# Patient Record
Sex: MALE | Race: BLACK OR AFRICAN AMERICAN | Employment: UNEMPLOYED | URBAN - METROPOLITAN AREA
[De-identification: names, ages, dates, MRNs, and addresses within clinical notes are randomized per-mention and may not be internally consistent; named-entity substitution may affect disease eponyms.]

---

## 2017-04-10 ENCOUNTER — OFFICE VISIT (OUTPATIENT)
Dept: INTERNAL MEDICINE CLINIC | Age: 57
End: 2017-04-10

## 2017-04-10 VITALS
DIASTOLIC BLOOD PRESSURE: 95 MMHG | HEART RATE: 71 BPM | TEMPERATURE: 97.8 F | BODY MASS INDEX: 28.58 KG/M2 | HEIGHT: 70 IN | OXYGEN SATURATION: 99 % | SYSTOLIC BLOOD PRESSURE: 151 MMHG | RESPIRATION RATE: 18 BRPM | WEIGHT: 199.6 LBS

## 2017-04-10 DIAGNOSIS — Z00.00 PHYSICAL EXAM: ICD-10-CM

## 2017-04-10 DIAGNOSIS — M10.9 ACUTE GOUT OF RIGHT KNEE, UNSPECIFIED CAUSE: Primary | ICD-10-CM

## 2017-04-10 RX ORDER — TRIAMCINOLONE ACETONIDE 40 MG/ML
40 INJECTION, SUSPENSION INTRA-ARTICULAR; INTRAMUSCULAR ONCE
Qty: 1 ML | Refills: 0
Start: 2017-04-10 | End: 2017-04-10

## 2017-04-10 RX ORDER — INDOMETHACIN 50 MG/1
50 CAPSULE ORAL
Qty: 90 CAP | Refills: 6 | Status: SHIPPED | OUTPATIENT
Start: 2017-04-10 | End: 2019-04-03 | Stop reason: SDUPTHER

## 2017-04-10 RX ORDER — LIDOCAINE HYDROCHLORIDE 10 MG/ML
5 INJECTION, SOLUTION EPIDURAL; INFILTRATION; INTRACAUDAL; PERINEURAL ONCE
Qty: 5 ML | Refills: 0
Start: 2017-04-10 | End: 2017-04-10

## 2017-04-10 NOTE — PROGRESS NOTES
1. Have you been to the ER, urgent care clinic since your last visit? Hospitalized since your last visit? No    2. Have you seen or consulted any other health care providers outside of the [address] [place] [address] since your last visit? Include any pap smears or colon screening.  No

## 2017-04-10 NOTE — MR AVS SNAPSHOT
Visit Information Date & Time Provider Department Dept. Phone Encounter #  
 4/10/2017 10:30 AM Joshua Duncan MD Formerly [facility] Sports Medicine and Primary Care [phone] [id number] Your Appointments 5/22/2017  9:30 AM  
Any with Joshua Duncan MD  
[address] [place] [address] and Primary Care [place] campus) Appt Note: 6 week f/u  
 Jeffrey Brar 90 1 [facility]  
  
   
 [address] [id number] Upcoming Health Maintenance Date Due Hepatitis C Screening 1960 FOBT Q 1 YEAR AGE 50-75 5/27/2016 DTaP/Tdap/Td series (2 - Td) 4/10/2027 Allergies as of 4/10/2017  Review Complete On: 4/10/2017 By: Perla Bolaños No Known Allergies Current Immunizations  Never Reviewed No immunizations on file. Not reviewed this visit You Were Diagnosed With   
  
 Codes Comments Acute gout of right knee, unspecified cause    -  Primary ICD-10-CM: M10.9 ICD-9-CM: 274.01 Physical exam     ICD-10-CM: Z00.00 ICD-9-CM: V70.9 Vitals BP Pulse Temp Resp Height(growth percentile) Weight(growth percentile) (!) 151/95 (BP 1 Location: Left arm, BP Patient Position: Sitting) 71 97.8 °F (36.6 °C) (Oral) 18 5' 10\" (1.778 m) 199 lb 9.6 oz (90.5 kg) SpO2 BMI Smoking Status 99% 28.64 kg/m2 Current Some Day Smoker Vitals History BMI and BSA Data Body Mass Index Body Surface Area  
 28.64 kg/m 2 2.11 m 2 Preferred Pharmacy Pharmacy Name Phone [facility] [address], [address] [facility] [phone] Your Updated Medication List  
  
   
This list is accurate as of: 4/10/17 11:38 AM.  Always use your most recent med list.  
  
  
  
  
 aspirin delayed-release 81 mg tablet Take  by mouth daily. indomethacin 50 mg capsule Commonly known as:  INDOCIN Take 1 Cap by mouth three (3) times daily as needed. lidocaine (PF) 10 mg/mL (1 %) injection Commonly known as:  XYLOCAINE  
5 mL by Intra artICUlar route once for 1 dose. triamcinolone acetonide 40 mg/mL injection Commonly known as:  KENALOG  
1 mL by IntraMUSCular route once for 1 dose. Prescriptions Sent to Pharmacy Refills  
 indomethacin (INDOCIN) 50 mg capsule 6 Sig: Take 1 Cap by mouth three (3) times daily as needed. Class: Normal  
 Pharmacy: [facility] [address] [place], [place] [address], [address] [place] [address] Ph #: [phone] Route: Oral  
  
We Performed the Following APOLIPOPROTEIN B [id number] CPT(R)] CBC WITH AUTOMATED DIFF [91573 CPT(R)] CRYSTALS, SYNOVIAL FLUID [id number] CPT(R)] LIPID PANEL [83208 CPT(R)] METABOLIC PANEL, COMPREHENSIVE [28468 CPT(R)] [place] COLLECTION VENOUS BLOOD,VENIPUNCTURE [id number] CPT(R)] PROSTATE SPECIFIC AG (PSA) U5567537 CPT(R)] TRIAMCINOLONE ACETONIDE INJ [ HCPCS] URIC ACID D8841048 CPT(R)] URINALYSIS W/ RFLX MICROSCOPIC [65040 CPT(R)] Introducing [facility] & HEALTH SERVICES! Dear Hosie Gosselin: Thank you for requesting a TwoF account. Our records indicate that you already have an active TwoF account. You can access your account anytime at [email or web address] Did you know that you can access your hospital and ER discharge instructions at any time in TwoF? You can also review all of your test results from your hospital stay or ER visit. Additional Information If you have questions, please visit the Frequently Asked Questions section of the TwoF website at [email or web address] Remember, TwoF is NOT to be used for urgent needs. For medical emergencies, dial 911. Now available from your iPhone and Android! Please provide this summary of care documentation to your next provider. If you have any questions after today's visit, please call [phone].

## 2017-04-10 NOTE — LETTER
NOTIFICATION RETURN TO WORK / SCHOOL 
 
4/10/2017 11:39 AM 
 
Mr. Arnoldo Lucio 
[address] [place] [address] To Whom It May Concern: 
 
Arnoldo Lucio is currently under the care of Jeffrey Edmonds Please excuse Mr. Goetz from work starting: Monday, April 10th thru Wednesday, April 12th. He will return to work on: Thursday, April 13, 2017. If there are questions or concerns please have the patient contact our office. Sincerely, Willow Purcell MD

## 2017-04-16 NOTE — PROGRESS NOTES
Chief Complaint   Patient presents with    Knee Pain     patient complain of having right knee pain    . SUBECTIVE:    Lia Mai is a 59 y.o. male comes in for a physical examination. The actual reason he came was because of acute swelling and pain of his right knee. There has been no history of trauma. The pain started and intensified within a twenty-four hour period. He does have a history of gout based on a previous synovial analysis done in 2014. He has been free of joint pain since then, however. Current Outpatient Prescriptions   Medication Sig Dispense Refill    indomethacin (INDOCIN) 50 mg capsule Take 1 Cap by mouth three (3) times daily as needed. 90 Cap 6    aspirin delayed-release 81 mg tablet Take  by mouth daily.        Past Medical History:   Diagnosis Date    Arthritis     Hypercholesterolemia      Past Surgical History:   Procedure Laterality Date    HX COLONOSCOPY       No Known Allergies    REVIEW OF SYSTEMS:  Review of Systems - Negative except   ENT ROS: negative for - headaches, hearing change, nasal congestion, oral lesions, tinnitus, visual changes or   Respiratory ROS: no cough, shortness of breath, or wheezing  Cardiovascular ROS: no chest pain or dyspnea on exertion  Gastrointestinal ROS: no abdominal pain, change in bowel habits, or black or blood  Genito-Urinary ROS: no dysuria, trouble voiding, or hematuria  Musculoskeletal ROS: negative  Neurological ROS: no TIA or stroke symptoms      Social History     Social History    Marital status:      Spouse name: N/A    Number of children: N/A    Years of education: N/A     Social History Main Topics    Smoking status: Current Some Day Smoker    Smokeless tobacco: None    Alcohol use 0.5 oz/week     1 Cans of beer per week    Drug use: None    Sexual activity: Not Asked     Other Topics Concern    None     Social History Narrative   r  Family History   Problem Relation Age of Onset    Cancer Mother        OBJECTIVE:  Visit Vitals    BP (!) 151/95 (BP 1 Location: Left arm, BP Patient Position: Sitting)    Pulse 71    Temp 97.8 °F (36.6 °C) (Oral)    Resp 18    Ht 5' 10\" (1.778 m)    Wt 199 lb 9.6 oz (90.5 kg)    SpO2 99%    BMI 28.64 kg/m2     ENT: perrla,  eom intact  NECK: supple. Thyroid normal, no JVD  CHEST: clear to ascultation and percussion   HEART: regular rate and rhythm  ABD: soft, bowel sounds active,   EXTREMITIES: no edema, pulse 1+, moderate swelling and warmth right knee, pain elicited to flexion and hyperextension right knee, modest effusion right knee  INTEGUMENT: clear      ASSESSMENT:  1. Acute gout of right knee, unspecified cause    2. Physical exam        PLAN:    1. The patient has an acute monoarticular arthritis. This is probably gout. The knee is injected with Kenalog 40 mg and Xylocaine 1% 5 cc under sterile technique laterally. He tolerates the injection well. Empiric treatment with indomethacin will occur. 2. He will return to the office in six months for followup. 3. Lab work will be drawn today. .  Orders Placed This Encounter    APOLIPOPROTEIN B    CBC WITH AUTOMATED DIFF    LIPID PANEL    URINALYSIS W/ RFLX MICROSCOPIC    PROSTATE SPECIFIC AG    METABOLIC PANEL, COMPREHENSIVE    URIC ACID    indomethacin (INDOCIN) 50 mg capsule    triamcinolone acetonide (KENALOG) 40 mg/mL injection    lidocaine, PF, (XYLOCAINE) 10 mg/mL (1 %) injection       Follow-up Disposition:  Return in about 6 months (around 10/10/2017).       Trish Martin MD

## 2017-05-22 ENCOUNTER — OFFICE VISIT (OUTPATIENT)
Dept: INTERNAL MEDICINE CLINIC | Age: 57
End: 2017-05-22

## 2017-05-22 VITALS
OXYGEN SATURATION: 98 % | HEIGHT: 70 IN | BODY MASS INDEX: 28.24 KG/M2 | RESPIRATION RATE: 20 BRPM | WEIGHT: 197.3 LBS | DIASTOLIC BLOOD PRESSURE: 80 MMHG | SYSTOLIC BLOOD PRESSURE: 143 MMHG | TEMPERATURE: 98.7 F | HEART RATE: 67 BPM

## 2017-05-22 DIAGNOSIS — F10.10 ALCOHOL ABUSE: ICD-10-CM

## 2017-05-22 DIAGNOSIS — M10.9 GOUT, UNSPECIFIED CAUSE, UNSPECIFIED CHRONICITY, UNSPECIFIED SITE: Primary | ICD-10-CM

## 2017-05-22 DIAGNOSIS — Z00.00 PHYSICAL EXAM: ICD-10-CM

## 2017-05-22 RX ORDER — ALLOPURINOL 300 MG/1
300 TABLET ORAL DAILY
Qty: 30 TAB | Refills: 11 | Status: SHIPPED | OUTPATIENT
Start: 2017-05-22 | End: 2019-12-31 | Stop reason: SDUPTHER

## 2017-05-22 NOTE — MR AVS SNAPSHOT
Visit Information Date & Time Provider Department Dept. Phone Encounter #  
 5/22/2017  9:30 AM Laureano Duong MD [facility] Sports Medicine and [place] [address] [id number] Follow-up Instructions Return in about 6 months (around 11/22/2017). Upcoming Health Maintenance Date Due Hepatitis C Screening 1960 FOBT Q 1 YEAR AGE 50-75 5/27/2016 INFLUENZA AGE 9 TO ADULT 8/1/2017 DTaP/Tdap/Td series (2 - Td) 4/10/2027 Allergies as of 5/22/2017  Review Complete On: 5/22/2017 By: Katerine Cowart No Known Allergies Current Immunizations  Never Reviewed No immunizations on file. Not reviewed this visit You Were Diagnosed With   
  
 Codes Comments Gout, unspecified cause, unspecified chronicity, unspecified site    -  Primary ICD-10-CM: M10.9 ICD-9-CM: 274.9 Physical exam     ICD-10-CM: Z00.00 ICD-9-CM: V70.9 Alcohol abuse     ICD-10-CM: F10.10 ICD-9-CM: 305.00 Vitals BP Pulse Temp Resp Height(growth percentile) Weight(growth percentile) 143/80 (BP 1 Location: Right arm, BP Patient Position: Sitting) 67 98.7 °F (37.1 °C) (Oral) 20 5' 10\" (1.778 m) 197 lb 4.8 oz (89.5 kg) SpO2 BMI Smoking Status 98% 28.31 kg/m2 Current Some Day Smoker Vitals History BMI and BSA Data Body Mass Index Body Surface Area  
 28.31 kg/m 2 2.1 m 2 Preferred Pharmacy Pharmacy Name Phone Briseyda  [address], [address] [place] [address] [phone] Your Updated Medication List  
  
   
This list is accurate as of: 5/22/17 11:09 AM.  Always use your most recent med list.  
  
  
  
  
 allopurinol 300 mg tablet Commonly known as:  Katerine Ricardo Take 1 Tab by mouth daily. aspirin delayed-release 81 mg tablet Take  by mouth daily. indomethacin 50 mg capsule Commonly known as:  INDOCIN  
 Take 1 Cap by mouth three (3) times daily as needed. Prescriptions Sent to Pharmacy Refills  
 allopurinol (ZYLOPRIM) 300 mg tablet 11 Sig: Take 1 Tab by mouth daily. Class: Normal  
 Pharmacy: [facility], [address] [place] [address] #: [phone] Route: Oral  
  
We Performed the Following APOLIPOPROTEIN B [id number] CPT(R)] CBC WITH AUTOMATED DIFF [89878 CPT(R)] CRP, HIGH SENSITIVITY [14863 CPT(R)] LIPID PANEL [63704 CPT(R)] METABOLIC PANEL, COMPREHENSIVE [72537 CPT(R)] OCCULT BLOOD, IMMUNOASSAY (FIT) A1156208 CPT(R)] CT COLLECTION VENOUS BLOOD,VENIPUNCTURE M205595 CPT(R)] PROSTATE SPECIFIC AG (PSA) R722933 CPT(R)] URIC ACID M2014725 CPT(R)] URINALYSIS W/ RFLX MICROSCOPIC [78333 CPT(R)] Follow-up Instructions Return in about 6 months (around 11/22/2017). Introducing [facility] & HEALTH SERVICES! Dear Pepper Boeck: Thank you for requesting a Neokinetics account. Our records indicate that you already have an active Neokinetics account. You can access your account anytime at [email or web address] Did you know that you can access your hospital and ER discharge instructions at any time in Neokinetics? You can also review all of your test results from your hospital stay or ER visit. Additional Information If you have questions, please visit the Frequently Asked Questions section of the Neokinetics website at [email or web address] Remember, Neokinetics is NOT to be used for urgent needs. For medical emergencies, dial 911. Now available from your iPhone and Android! Please provide this summary of care documentation to your next provider. If you have any questions after today's visit, please call [phone].

## 2017-05-22 NOTE — PROGRESS NOTES
Chief Complaint   Patient presents with    Gout     6 week follow up for gout in right knee   1. Have you been to the ER, urgent care clinic since your last visit? Hospitalized since your last visit? No    2. Have you seen or consulted any other health care providers outside of the [address] [place] [address] since your last visit? Include any pap smears or colon screening.  No

## 2017-05-22 NOTE — PROGRESS NOTES
Chief Complaint   Patient presents with    Gout     6 week follow up for gout in right knee   . SUBECTIVE:    Redd Maynard is a 59 y.o. male comes in for return visit stating that he feels much better. His gout has resolved. I am assuming this is the etiology although I have not formally documented it from uric acid crystals from synovial analysis. His clinical history is quite suggestive of this. He states that he otherwise has bene doing well. He drinks alcohol, almost a six pack a day. Current Outpatient Prescriptions   Medication Sig Dispense Refill    allopurinol (ZYLOPRIM) 300 mg tablet Take 1 Tab by mouth daily. 30 Tab 11    indomethacin (INDOCIN) 50 mg capsule Take 1 Cap by mouth three (3) times daily as needed. 90 Cap 6    aspirin delayed-release 81 mg tablet Take  by mouth daily.        Past Medical History:   Diagnosis Date    Arthritis     Hypercholesterolemia      Past Surgical History:   Procedure Laterality Date    HX COLONOSCOPY       No Known Allergies    REVIEW OF SYSTEMS:  Review of Systems - Negative except   ENT ROS: negative for - headaches, hearing change, nasal congestion, oral lesions, tinnitus, visual changes or   Respiratory ROS: no cough, shortness of breath, or wheezing  Cardiovascular ROS: no chest pain or dyspnea on exertion  Gastrointestinal ROS: no abdominal pain, change in bowel habits, or black or blood  Genito-Urinary ROS: no dysuria, trouble voiding, or hematuria  Musculoskeletal ROS: negative  Neurological ROS: no TIA or stroke symptoms      Social History     Social History    Marital status:      Spouse name: N/A    Number of children: N/A    Years of education: N/A     Social History Main Topics    Smoking status: Current Some Day Smoker    Smokeless tobacco: Never Used    Alcohol use 0.5 oz/week     1 Cans of beer per week    Drug use: No    Sexual activity: Yes     Partners: Female     Other Topics Concern    None Social History Narrative   r  Family History   Problem Relation Age of Onset    Cancer Mother        OBJECTIVE:  Visit Vitals    /80 (BP 1 Location: Right arm, BP Patient Position: Sitting)    Pulse 67    Temp 98.7 °F (37.1 °C) (Oral)    Resp 20    Ht 5' 10\" (1.778 m)    Wt 197 lb 4.8 oz (89.5 kg)    SpO2 98%    BMI 28.31 kg/m2     ENT: perrla,  eom intact  NECK: supple. Thyroid normal, no JVD  CHEST: clear to ascultation and percussion   HEART: regular rate and rhythm  ABD: soft, bowel sounds active,   EXTREMITIES: no edema, pulse 1+, normal range of motion of all joints  INTEGUMENT: clear      ASSESSMENT:  1. Gout, unspecified cause, unspecified chronicity, unspecified site    2. Alcohol abuse    3. Physical exam        PLAN:    1. He has been having recurrent episodes of gout which is not surprising with his alcohol intake. I will therefore start him on Allopurinol 300 mg every day. For the first three weeks of taking this he will take Indomethacin 50 mg b.i.d. Thereafter he will discontinue Indomethacin and hopefully he will be over the potential flares induced by initiation of Allopurinol. 2. I also strongly suggest he reduce his alcohol consumption. He is drinking too much. I suggest no more than two or three beers max in a 24 hour period. 3. Lab work will be drawn today. Follow-up Disposition:  Return in about 6 months (around 11/22/2017).       Jessa Malloy MD

## 2017-05-23 LAB
ALBUMIN SERPL-MCNC: 4.4 G/DL (ref 3.5–5.5)
ALBUMIN/GLOB SERPL: 1.6 {RATIO} (ref 1.2–2.2)
ALP SERPL-CCNC: 50 IU/L (ref 39–117)
ALT SERPL-CCNC: 38 IU/L (ref 0–44)
APO B SERPL-MCNC: 106 MG/DL (ref 52–135)
APPEARANCE UR: CLEAR
AST SERPL-CCNC: 29 IU/L (ref 0–40)
BASOPHILS # BLD AUTO: 0 X10E3/UL (ref 0–0.2)
BASOPHILS NFR BLD AUTO: 0 %
BILIRUB SERPL-MCNC: 0.3 MG/DL (ref 0–1.2)
BILIRUB UR QL STRIP: NEGATIVE
BUN SERPL-MCNC: 15 MG/DL (ref 6–24)
BUN/CREAT SERPL: 17 (ref 9–20)
CALCIUM SERPL-MCNC: 9.6 MG/DL (ref 8.7–10.2)
CHLORIDE SERPL-SCNC: 101 MMOL/L (ref 96–106)
CHOLEST SERPL-MCNC: 191 MG/DL (ref 100–199)
CO2 SERPL-SCNC: 27 MMOL/L (ref 18–29)
COLOR UR: YELLOW
CREAT SERPL-MCNC: 0.89 MG/DL (ref 0.76–1.27)
CRP SERPL HS-MCNC: 1.1 MG/L (ref 0–3)
EOSINOPHIL # BLD AUTO: 0.1 X10E3/UL (ref 0–0.4)
EOSINOPHIL NFR BLD AUTO: 1 %
ERYTHROCYTE [DISTWIDTH] IN BLOOD BY AUTOMATED COUNT: 14.1 % (ref 12.3–15.4)
GLOBULIN SER CALC-MCNC: 2.7 G/DL (ref 1.5–4.5)
GLUCOSE SERPL-MCNC: 85 MG/DL (ref 65–99)
GLUCOSE UR QL: NEGATIVE
HCT VFR BLD AUTO: 43.6 % (ref 37.5–51)
HDLC SERPL-MCNC: 68 MG/DL
HGB BLD-MCNC: 14.7 G/DL (ref 12.6–17.7)
HGB UR QL STRIP: NEGATIVE
IMM GRANULOCYTES # BLD: 0.1 X10E3/UL (ref 0–0.1)
IMM GRANULOCYTES NFR BLD: 1 %
KETONES UR QL STRIP: NEGATIVE
LDLC SERPL CALC-MCNC: 110 MG/DL (ref 0–99)
LEUKOCYTE ESTERASE UR QL STRIP: NEGATIVE
LYMPHOCYTES # BLD AUTO: 2.1 X10E3/UL (ref 0.7–3.1)
LYMPHOCYTES NFR BLD AUTO: 27 %
MCH RBC QN AUTO: 31.6 PG (ref 26.6–33)
MCHC RBC AUTO-ENTMCNC: 33.7 G/DL (ref 31.5–35.7)
MCV RBC AUTO: 94 FL (ref 79–97)
MICRO URNS: NORMAL
MONOCYTES # BLD AUTO: 0.7 X10E3/UL (ref 0.1–0.9)
MONOCYTES NFR BLD AUTO: 9 %
NEUTROPHILS # BLD AUTO: 4.9 X10E3/UL (ref 1.4–7)
NEUTROPHILS NFR BLD AUTO: 62 %
NITRITE UR QL STRIP: NEGATIVE
PH UR STRIP: 6 [PH] (ref 5–7.5)
PLATELET # BLD AUTO: 270 X10E3/UL (ref 150–379)
POTASSIUM SERPL-SCNC: 5.2 MMOL/L (ref 3.5–5.2)
PROT SERPL-MCNC: 7.1 G/DL (ref 6–8.5)
PROT UR QL STRIP: NEGATIVE
PSA SERPL-MCNC: 0.4 NG/ML (ref 0–4)
RBC # BLD AUTO: 4.65 X10E6/UL (ref 4.14–5.8)
SODIUM SERPL-SCNC: 144 MMOL/L (ref 134–144)
SP GR UR: 1.02 (ref 1–1.03)
TRIGL SERPL-MCNC: 64 MG/DL (ref 0–149)
URATE SERPL-MCNC: 9.4 MG/DL (ref 3.7–8.6)
UROBILINOGEN UR STRIP-MCNC: 0.2 MG/DL (ref 0.2–1)
VLDLC SERPL CALC-MCNC: 13 MG/DL (ref 5–40)
WBC # BLD AUTO: 7.7 X10E3/UL (ref 3.4–10.8)

## 2017-12-12 ENCOUNTER — OFFICE VISIT (OUTPATIENT)
Dept: INTERNAL MEDICINE CLINIC | Age: 57
End: 2017-12-12

## 2017-12-12 VITALS
HEIGHT: 70 IN | RESPIRATION RATE: 16 BRPM | BODY MASS INDEX: 27.8 KG/M2 | SYSTOLIC BLOOD PRESSURE: 143 MMHG | WEIGHT: 194.2 LBS | HEART RATE: 75 BPM | OXYGEN SATURATION: 97 % | DIASTOLIC BLOOD PRESSURE: 81 MMHG | TEMPERATURE: 98.1 F

## 2017-12-12 DIAGNOSIS — J30.89 CHRONIC NONSEASONAL ALLERGIC RHINITIS DUE TO OTHER ALLERGEN: Primary | ICD-10-CM

## 2017-12-12 DIAGNOSIS — M1A.4690 OTHER SECONDARY CHRONIC GOUT OF KNEE WITHOUT TOPHUS, UNSPECIFIED LATERALITY: ICD-10-CM

## 2017-12-12 PROBLEM — J30.9 ALLERGIC RHINITIS DUE TO ALLERGEN: Status: ACTIVE | Noted: 2017-12-12

## 2017-12-12 PROBLEM — M10.9 GOUT: Status: ACTIVE | Noted: 2017-12-12

## 2017-12-12 RX ORDER — POLYETHYLENE GLYCOL 3350 17 G/17G
17 POWDER, FOR SOLUTION ORAL DAILY
COMMUNITY
End: 2022-02-06

## 2017-12-12 RX ORDER — FLUTICASONE PROPIONATE 50 MCG
SPRAY, SUSPENSION (ML) NASAL
Qty: 1 BOTTLE | Refills: 11 | Status: SHIPPED | OUTPATIENT
Start: 2017-12-12 | End: 2019-12-31 | Stop reason: SDUPTHER

## 2017-12-12 NOTE — PROGRESS NOTES
Chief Complaint   Patient presents with    Physical     patient states that he has been trying to experiencing some sinus congestion lately. .      SUBECTIVE:    Jasmin Rodrigues is a 59 y.o. male comes in for a return visit stating that he has not done well. For the last month, he has had upper respiratory-type symptoms episodically. He has nasal congestion with frequent sneezing and clearing of his throat. Additionally, he has a cough without any audible wheezing. He does not smoke cigarettes. He has not had any further gouty episodes either. He has been taking his allopurinal.      He has also reduced his consumption of beer. Current Outpatient Prescriptions   Medication Sig Dispense Refill    fluticasone (FLONASE) 50 mcg/actuation nasal spray 2 sprays in each nostril daily 1 Bottle 11    polyethylene glycol (MIRALAX) 17 gram packet Take 17 g by mouth daily.  allopurinol (ZYLOPRIM) 300 mg tablet Take 1 Tab by mouth daily. 30 Tab 11    indomethacin (INDOCIN) 50 mg capsule Take 1 Cap by mouth three (3) times daily as needed. 90 Cap 6    aspirin delayed-release 81 mg tablet Take  by mouth daily.        Past Medical History:   Diagnosis Date    Arthritis     Hypercholesterolemia      Past Surgical History:   Procedure Laterality Date    HX COLONOSCOPY       No Known Allergies    REVIEW OF SYSTEMS:  Review of Systems - Negative except   ENT ROS: negative for - headaches, hearing change, nasal congestion, oral lesions, tinnitus, visual changes or   Respiratory ROS: no cough, shortness of breath, or wheezing  Cardiovascular ROS: no chest pain or dyspnea on exertion  Gastrointestinal ROS: no abdominal pain, change in bowel habits, or black or blood  Genito-Urinary ROS: no dysuria, trouble voiding, or hematuria  Musculoskeletal ROS: negative  Neurological ROS: no TIA or stroke symptoms      Social History     Social History    Marital status:      Spouse name: N/A    Number of children: N/A    Years of education: N/A     Social History Main Topics    Smoking status: Current Some Day Smoker    Smokeless tobacco: Never Used    Alcohol use 0.5 oz/week     1 Cans of beer per week    Drug use: No    Sexual activity: Yes     Partners: Female     Other Topics Concern    None     Social History Narrative   r  Family History   Problem Relation Age of Onset    Cancer Mother        OBJECTIVE:  Visit Vitals    /81 (BP 1 Location: Left arm, BP Patient Position: Sitting)    Pulse 75    Temp 98.1 °F (36.7 °C) (Oral)    Resp 16    Ht 5' 10\" (1.778 m)    Wt 194 lb 3.2 oz (88.1 kg)    SpO2 97%    BMI 27.86 kg/m2     ENT: perrla,  eom intact  NECK: supple. Thyroid normal, no JVD  CHEST: clear to ascultation and percussion   HEART: regular rate and rhythm  ABD: soft, bowel sounds active,   EXTREMITIES: no edema, pulse 1+  INTEGUMENT: clear      ASSESSMENT:  1. Chronic nonseasonal allergic rhinitis due to other allergen    2. 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 The patient has a rhinitis. I will give him Flonase two sprays each nostril q.d. along with a long-acting antihistamine. 2. His gout appears to be quite stable, but I remind him to take his allopurinal daily so it will not return. 3. I also strongly suggest that he reduce his intake of beer. .  Orders Placed This Encounter    fluticasone (FLONASE) 50 mcg/actuation nasal spray    polyethylene glycol (MIRALAX) 17 gram packet       Follow-up Disposition:  Return in about 3 months (around 3/12/2018).       Yohannes Marshall MD

## 2017-12-12 NOTE — PROGRESS NOTES
Chief Complaint   Patient presents with    Physical     patient states that he has been trying to experiencing some sinus congestion lately. 1. Have you been to the ER, urgent care clinic since your last visit? Hospitalized since your last visit? No    2. Have you seen or consulted any other health care providers outside of the [address] [place] [address] since your last visit? Include any pap smears or colon screening.  No

## 2017-12-12 NOTE — MR AVS SNAPSHOT
Visit Information Date & Time Provider Department Dept. Phone Encounter #  
 12/12/2017 10:45 AM Savana Quintana [facility] Sports Medicine and [address] [id number] Upcoming Health Maintenance Date Due Hepatitis C Screening 1960 FOBT Q 1 YEAR AGE 50-75 5/27/2016 DTaP/Tdap/Td series (2 - Td) 4/10/2027 Allergies as of 12/12/2017  Review Complete On: 12/12/2017 By: Lisa Ferrara No Known Allergies Current Immunizations  Never Reviewed No immunizations on file. Not reviewed this visit You Were Diagnosed With   
  
 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  
 27.86 kg/m 2 2.09 m 2 Preferred Pharmacy Pharmacy Name Phone [facility] [address], [address] [place] [address] [phone] Your Updated Medication List  
  
   
This list is accurate as of: 12/12/17 12:14 PM.  Always use your most recent med list.  
  
  
  
  
 allopurinol 300 mg tablet Commonly known as:  Wale Night Take 1 Tab by mouth daily. aspirin delayed-release 81 mg tablet Take  by mouth daily. fluticasone 50 mcg/actuation nasal spray Commonly known as:  FLONASE  
2 sprays in each nostril daily  
  
 indomethacin 50 mg capsule Commonly known as:  INDOCIN  
 Take 1 Cap by mouth three (3) times daily as needed. Prescriptions Sent to Pharmacy Refills  
 fluticasone (FLONASE) 50 mcg/actuation nasal spray 11 Si sprays in each nostril daily Class: Normal  
 Pharmacy: [id number], [address] [place] [address] #: [phone] Patient Instructions Claritin(loratidine) 1 tablet daily for at least 2 weeks Introducing [facility] [place] [facility] SERVICES! Dear Alyse King: Thank you for requesting a Zenter account. Our records indicate that you already have an active Zenter account. You can access your account anytime at [email or web address] Did you know that you can access your hospital and ER discharge instructions at any time in Zenter? You can also review all of your test results from your hospital stay or ER visit. Additional Information If you have questions, please visit the Frequently Asked Questions section of the Zenter website at [email or web address] Remember, Zenter is NOT to be used for urgent needs. For medical emergencies, dial 911. Now available from your iPhone and Android! Please provide this summary of care documentation to your next provider. If you have any questions after today's visit, please call [phone].

## 2018-06-12 ENCOUNTER — OFFICE VISIT (OUTPATIENT)
Dept: INTERNAL MEDICINE CLINIC | Age: 58
End: 2018-06-12

## 2018-06-12 VITALS
RESPIRATION RATE: 16 BRPM | DIASTOLIC BLOOD PRESSURE: 79 MMHG | TEMPERATURE: 98.3 F | HEIGHT: 70 IN | OXYGEN SATURATION: 97 % | HEART RATE: 75 BPM | WEIGHT: 190.8 LBS | BODY MASS INDEX: 27.32 KG/M2 | SYSTOLIC BLOOD PRESSURE: 131 MMHG

## 2018-06-12 DIAGNOSIS — R19.5 OCCULT BLOOD IN STOOLS: ICD-10-CM

## 2018-06-12 DIAGNOSIS — M1A.4690 OTHER SECONDARY CHRONIC GOUT OF KNEE WITHOUT TOPHUS, UNSPECIFIED LATERALITY: ICD-10-CM

## 2018-06-12 DIAGNOSIS — F43.9 STRESS: ICD-10-CM

## 2018-06-12 DIAGNOSIS — G62.9 NEUROPATHY: Primary | ICD-10-CM

## 2018-06-12 DIAGNOSIS — F10.10 ALCOHOL ABUSE: ICD-10-CM

## 2018-06-12 DIAGNOSIS — Z00.00 PHYSICAL EXAM: ICD-10-CM

## 2018-06-12 NOTE — PROGRESS NOTES
[address] [place] [address] and Primary Care  [place] [address]  [address] [place] [address]  Phone:  [phone]  Fax: [phone]       Chief Complaint   Patient presents with    Gout     6 month follow up    . SUBJECTIVE:    Saqib Wells is a 59 y.o. male   Comes in for a return visit complaining of numbness and tingling in his legs when he climbs a ladder. He is doing this on his job as he has done for 12 years as a [employer] worker. Most recently, he found that this was unbearable so he stopped working and does not really want to go back. Since he stopped climbing a ladder, he no longer has paresthesias or numbness of his legs. He does drink alcohol modestly. He does have a history of gout which has been reasonably stable. He also complains of being stressed. The stressors emanated from his job although he is quite vague in terms of why this is happening. Current Outpatient Prescriptions   Medication Sig Dispense Refill    fluticasone (FLONASE) 50 mcg/actuation nasal spray 2 sprays in each nostril daily 1 Bottle 11    polyethylene glycol (MIRALAX) 17 gram packet Take 17 g by mouth daily.  allopurinol (ZYLOPRIM) 300 mg tablet Take 1 Tab by mouth daily. 30 Tab 11    indomethacin (INDOCIN) 50 mg capsule Take 1 Cap by mouth three (3) times daily as needed. 90 Cap 6    aspirin delayed-release 81 mg tablet Take  by mouth daily.        Past Medical History:   Diagnosis Date    Arthritis     Hypercholesterolemia      Past Surgical History:   Procedure Laterality Date    HX COLONOSCOPY       No Known Allergies      REVIEW OF SYSTEMS:  General: negative for - chills or fever  ENT: negative for - headaches, nasal congestion or tinnitus  Respiratory: negative for - cough, hemoptysis, shortness of breath or wheezing  Cardiovascular : negative for - chest pain, edema, palpitations or shortness of breath  Gastrointestinal: negative for - abdominal pain, blood in stools, heartburn or nausea/vomiting  Genito-Urinary: no dysuria, trouble voiding, or hematuria  Musculoskeletal: negative for - gait disturbance, joint pain, joint stiffness or joint swelling  Neurological: no TIA or stroke symptoms  Hematologic: no bruises, no bleeding, no swollen glands  Integument: no lumps, mole changes, nail changes or rash  Endocrine: no malaise/lethargy or unexpected weight changes      Social History     Social History    Marital status:      Spouse name: N/A    Number of children: 1    Years of education: N/A     Occupational History    Former HVAC worker      Social History Main Topics    Smoking status: Current Some Day Smoker    Smokeless tobacco: Never Used    Alcohol use 0.5 oz/week     1 Cans of beer per week    Drug use: No    Sexual activity: Yes     Partners: Female     Other Topics Concern    None     Social History Narrative     Family History   Problem Relation Age of Onset    Cancer Mother        OBJECTIVE:    Visit Vitals    /79    Pulse 75    Temp 98.3 °F (36.8 °C) (Oral)    Resp 16    Ht 5' 10\" (1.778 m)    Wt 190 lb 12.8 oz (86.5 kg)    SpO2 97%    BMI 27.38 kg/m2     CONSTITUTIONAL: well , well nourished, appears age appropriate  EYES: perrla, eom intact  ENMT:moist mucous membranes, pharynx clear  NECK: supple. Thyroid normal  RESPIRATORY: Chest: clear to ascultation and percussion   CARDIOVASCULAR: Heart: regular rate and rhythm  GASTROINTESTINAL: Abdomen: soft, bowel sounds active  HEMATOLOGIC: no pathological lymph nodes palpated  MUSCULOSKELETAL: Extremities: no edema, pulse 1+   INTEGUMENT: No unusual rashes or suspicious skin lesions noted. Nails appear normal.  NEUROLOGIC: non-focal exam   MENTAL STATUS: alert and oriented, appropriate affect      ASSESSMENT:  1. Neuropathy    2. Stress    3. Alcohol abuse    4. 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 Occult blood in stools    6.  Physical exam PLAN:    1. I find no neurological findings on his exam. His symptoms suggest a neuropathic origin. There is not much I can offer him at this time. I do not think this represents lumbar spine disease. 2. He is indeed stressed for whatever reason, but he is not frankly depressed. 3. I encouraged him to reduce alcohol consumption. 4. Gout is quite stable with no flare. 5. He plans to get another job soon and I encouraged him to remain as physically active as possible. The worst thing that could happen to this man is to stop work where he would probably increase his alcohol intake significantly. .  Orders Placed This Encounter    OCCULT BLOOD, IMMUNOASSAY (FIT)    HEPATITIS C AB    APOLIPOPROTEIN B    CBC WITH AUTOMATED DIFF    CRP, HIGH SENSITIVITY    LIPID PANEL    METABOLIC PANEL, COMPREHENSIVE    PROSTATE SPECIFIC AG    URINALYSIS W/ RFLX MICROSCOPIC         Follow-up Disposition:  Return in about 6 months (around 12/12/2018).       Dary Weinstein MD

## 2018-06-12 NOTE — MR AVS SNAPSHOT
[address] [place] [address] [id number] 
[phone] Patient: Margi Cervantes MRN: [id number] IJN:[id number] Visit Information Date & Time Provider Department Dept. Phone Encounter #  
 6/12/2018  9:30 AM MD Annelise Ayala [facility] and [place] [id number] Upcoming Health Maintenance Date Due Hepatitis C Screening 1960 FOBT Q 1 YEAR AGE 50-75 5/27/2016 Influenza Age 5 to Adult 8/1/2018 DTaP/Tdap/Td series (2 - Td) 4/10/2027 Allergies as of 6/12/2018  Review Complete On: 6/12/2018 By: Rodney Palm No Known Allergies Current Immunizations  Never Reviewed No immunizations on file. Not reviewed this visit You Were Diagnosed With   
  
 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  
 27.38 kg/m 2 2.07 m 2 Preferred Pharmacy Pharmacy Name Phone [facility] [address], [address] [place] [address] [phone] Your Updated Medication List  
  
   
This list is accurate as of 6/12/18 11:51 AM.  Always use your most recent med list.  
  
  
  
  
 allopurinol 300 mg tablet Commonly known as:  Charol Francie  
 Take 1 Tab by mouth daily. aspirin delayed-release 81 mg tablet Take  by mouth daily. fluticasone 50 mcg/actuation nasal spray Commonly known as:  FLONASE  
2 sprays in each nostril daily  
  
 indomethacin 50 mg capsule Commonly known as:  INDOCIN Take 1 Cap by mouth three (3) times daily as needed. MIRALAX 17 gram packet Generic drug:  polyethylene glycol Take 17 g by mouth daily. We Performed the Following APOLIPOPROTEIN B [id number] CPT(R)] CBC WITH AUTOMATED DIFF [07206 CPT(R)] COLLECTION VENOUS BLOOD,VENIPUNCTURE [id number] CPT(R)] CRP, HIGH SENSITIVITY [92986 CPT(R)] HEPATITIS C AB [40937 CPT(R)] LIPID PANEL [44346 CPT(R)] METABOLIC PANEL, COMPREHENSIVE [18285 CPT(R)] OCCULT BLOOD, IMMUNOASSAY (FIT) [id number] CPT(R)] PSA, DIAGNOSTIC (PROSTATE SPECIFIC AG) [id number] CPT(R)] URINALYSIS W/ RFLX MICROSCOPIC [88825 CPT(R)] Introducing [facility] SERVICES! Dear Janine Liang: Thank you for requesting a [employer] account. Our records indicate that you already have an active [employer] account. You can access your account anytime at [email or web address] Did you know that you can access your hospital and ER discharge instructions at any time in [employer]? You can also review all of your test results from your hospital stay or ER visit. Additional Information If you have questions, please visit the Frequently Asked Questions section of the [employer] website at [email or web address] Remember, [employer] is NOT to be used for urgent needs. For medical emergencies, dial 911. Now available from your iPhone and Android! Please provide this summary of care documentation to your next provider. If you have any questions after today's visit, please call [phone].

## 2018-06-12 NOTE — PROGRESS NOTES
Chief Complaint   Patient presents with    Gout     6 month follow up      1. Have you been to the ER, urgent care clinic since your last visit? Hospitalized since your last visit? No    2. Have you seen or consulted any other health care providers outside of the [address] [place] [address] since your last visit? Include any pap smears or colon screening.  No

## 2018-06-13 LAB
ALBUMIN SERPL-MCNC: 4.4 G/DL (ref 3.6–4.8)
ALBUMIN/GLOB SERPL: 1.5 {RATIO} (ref 1.2–2.2)
ALP SERPL-CCNC: 44 IU/L (ref 39–117)
ALT SERPL-CCNC: 37 IU/L (ref 0–44)
APO B SERPL-MCNC: 106 MG/DL (ref 52–135)
APPEARANCE UR: CLEAR
AST SERPL-CCNC: 32 IU/L (ref 0–40)
BASOPHILS # BLD AUTO: 0 X10E3/UL (ref 0–0.2)
BASOPHILS NFR BLD AUTO: 0 %
BILIRUB SERPL-MCNC: 0.4 MG/DL (ref 0–1.2)
BILIRUB UR QL STRIP: NEGATIVE
BUN SERPL-MCNC: 15 MG/DL (ref 8–27)
BUN/CREAT SERPL: 15 (ref 10–24)
CALCIUM SERPL-MCNC: 9.6 MG/DL (ref 8.6–10.2)
CHLORIDE SERPL-SCNC: 98 MMOL/L (ref 96–106)
CHOLEST SERPL-MCNC: 159 MG/DL (ref 100–199)
CO2 SERPL-SCNC: 24 MMOL/L (ref 20–29)
COLOR UR: YELLOW
CREAT SERPL-MCNC: 0.98 MG/DL (ref 0.76–1.27)
CRP SERPL HS-MCNC: 1.05 MG/L (ref 0–3)
EOSINOPHIL # BLD AUTO: 0 X10E3/UL (ref 0–0.4)
EOSINOPHIL NFR BLD AUTO: 0 %
ERYTHROCYTE [DISTWIDTH] IN BLOOD BY AUTOMATED COUNT: 14 % (ref 12.3–15.4)
GFR SERPLBLD CREATININE-BSD FMLA CKD-EPI: 83 ML/MIN/1.73
GFR SERPLBLD CREATININE-BSD FMLA CKD-EPI: 96 ML/MIN/1.73
GLOBULIN SER CALC-MCNC: 2.9 G/DL (ref 1.5–4.5)
GLUCOSE SERPL-MCNC: 98 MG/DL (ref 65–99)
GLUCOSE UR QL: NEGATIVE
HCT VFR BLD AUTO: 44.8 % (ref 37.5–51)
HCV AB S/CO SERPL IA: <0.1 S/CO RATIO (ref 0–0.9)
HDLC SERPL-MCNC: 49 MG/DL
HGB BLD-MCNC: 15 G/DL (ref 13–17.7)
HGB UR QL STRIP: NEGATIVE
IMM GRANULOCYTES # BLD: 0 X10E3/UL (ref 0–0.1)
IMM GRANULOCYTES NFR BLD: 0 %
KETONES UR QL STRIP: NEGATIVE
LDLC SERPL CALC-MCNC: 93 MG/DL (ref 0–99)
LEUKOCYTE ESTERASE UR QL STRIP: NEGATIVE
LYMPHOCYTES # BLD AUTO: 1.9 X10E3/UL (ref 0.7–3.1)
LYMPHOCYTES NFR BLD AUTO: 24 %
MCH RBC QN AUTO: 31.8 PG (ref 26.6–33)
MCHC RBC AUTO-ENTMCNC: 33.5 G/DL (ref 31.5–35.7)
MCV RBC AUTO: 95 FL (ref 79–97)
MICRO URNS: NORMAL
MONOCYTES # BLD AUTO: 0.8 X10E3/UL (ref 0.1–0.9)
MONOCYTES NFR BLD AUTO: 9 %
NEUTROPHILS # BLD AUTO: 5.4 X10E3/UL (ref 1.4–7)
NEUTROPHILS NFR BLD AUTO: 67 %
NITRITE UR QL STRIP: NEGATIVE
PH UR STRIP: 5 [PH] (ref 5–7.5)
PLATELET # BLD AUTO: 263 X10E3/UL (ref 150–379)
POTASSIUM SERPL-SCNC: 4.7 MMOL/L (ref 3.5–5.2)
PROT SERPL-MCNC: 7.3 G/DL (ref 6–8.5)
PROT UR QL STRIP: NEGATIVE
PSA SERPL-MCNC: 0.5 NG/ML (ref 0–4)
RBC # BLD AUTO: 4.72 X10E6/UL (ref 4.14–5.8)
SODIUM SERPL-SCNC: 139 MMOL/L (ref 134–144)
SP GR UR: 1.02 (ref 1–1.03)
TRIGL SERPL-MCNC: 85 MG/DL (ref 0–149)
UROBILINOGEN UR STRIP-MCNC: 0.2 MG/DL (ref 0.2–1)
VLDLC SERPL CALC-MCNC: 17 MG/DL (ref 5–40)
WBC # BLD AUTO: 8.1 X10E3/UL (ref 3.4–10.8)

## 2018-12-28 ENCOUNTER — TELEPHONE (OUTPATIENT)
Dept: INTERNAL MEDICINE CLINIC | Age: 58
End: 2018-12-28

## 2018-12-28 NOTE — TELEPHONE ENCOUNTER
Patient wife called stating that he is having knee pain again. And it is gout. Per Dr. Antoni Varghese we refill Allopurinol 300mg 1 tab daily #30 x 11 refills to take all the time and Indomethacin 50mg cap 1 tid until pain gone and a 30 day supply only of colchicine0.6mg 1 every day.

## 2019-04-03 RX ORDER — INDOMETHACIN 50 MG/1
50 CAPSULE ORAL
Qty: 90 CAP | Refills: 6 | Status: SHIPPED | OUTPATIENT
Start: 2019-04-03 | End: 2019-04-18 | Stop reason: SDUPTHER

## 2019-04-19 RX ORDER — INDOMETHACIN 50 MG/1
50 CAPSULE ORAL
Qty: 90 CAP | Refills: 2 | Status: SHIPPED | OUTPATIENT
Start: 2019-04-19 | End: 2019-12-31 | Stop reason: SDUPTHER

## 2019-12-31 ENCOUNTER — OFFICE VISIT (OUTPATIENT)
Dept: INTERNAL MEDICINE CLINIC | Age: 59
End: 2019-12-31

## 2019-12-31 VITALS
HEART RATE: 80 BPM | SYSTOLIC BLOOD PRESSURE: 133 MMHG | OXYGEN SATURATION: 99 % | HEIGHT: 70 IN | WEIGHT: 200.7 LBS | RESPIRATION RATE: 16 BRPM | TEMPERATURE: 98 F | DIASTOLIC BLOOD PRESSURE: 77 MMHG | BODY MASS INDEX: 28.73 KG/M2

## 2019-12-31 DIAGNOSIS — Z00.00 PHYSICAL EXAM: ICD-10-CM

## 2019-12-31 DIAGNOSIS — E78.5 DYSLIPIDEMIA: ICD-10-CM

## 2019-12-31 DIAGNOSIS — J31.0 OTHER RHINITIS: ICD-10-CM

## 2019-12-31 DIAGNOSIS — H54.62 DECREASED VISION OF LEFT EYE: Primary | ICD-10-CM

## 2019-12-31 DIAGNOSIS — M1A.4690 OTHER SECONDARY CHRONIC GOUT OF KNEE WITHOUT TOPHUS, UNSPECIFIED LATERALITY: ICD-10-CM

## 2019-12-31 DIAGNOSIS — E66.3 OVERWEIGHT (BMI 25.0-29.9): ICD-10-CM

## 2019-12-31 RX ORDER — FLUTICASONE PROPIONATE 50 MCG
SPRAY, SUSPENSION (ML) NASAL
Qty: 1 BOTTLE | Refills: 11 | Status: SHIPPED | OUTPATIENT
Start: 2019-12-31 | End: 2021-01-26 | Stop reason: SDUPTHER

## 2019-12-31 RX ORDER — ALLOPURINOL 300 MG/1
300 TABLET ORAL DAILY
Qty: 30 TAB | Refills: 11 | Status: SHIPPED | OUTPATIENT
Start: 2019-12-31 | End: 2021-02-17 | Stop reason: SDUPTHER

## 2019-12-31 RX ORDER — INDOMETHACIN 50 MG/1
50 CAPSULE ORAL
Qty: 60 CAP | Refills: 0 | Status: SHIPPED | OUTPATIENT
Start: 2019-12-31 | End: 2021-01-26 | Stop reason: SDUPTHER

## 2019-12-31 NOTE — PROGRESS NOTES
[address] [place] [address] and Primary Care  [place] [address]  [address] [place] [address]  Phone:  [phone]  Fax: [phone]       Chief Complaint   Patient presents with    Cold Symptoms     Patient is here for a cold. .      SUBJECTIVE:    Ai Godinez is a 62 y.o. male Comes in for return visit stating that he has done reasonably well. He has noted frequent clearing of his throat and occasional coughing intermittently for the last three months. He has noted some reduction in visual acuity in his left eye in the last month or so. He has had a recurrence of gout, primarily because he has not been taking his Allopurinol on a consistent basis. Finally, he has not gained any weight, but does indeed need to lose in view of his current level. He has significantly reduced his alcohol consumption. He does have a history of alcohol abuse. Current Outpatient Medications   Medication Sig Dispense Refill    fluticasone propionate (FLONASE) 50 mcg/actuation nasal spray 2 sprays in each nostril daily 1 Bottle 11    allopurinol (ZYLOPRIM) 300 mg tablet Take 1 Tab by mouth daily. 30 Tab 11    indomethacin (INDOCIN) 50 mg capsule Take 1 Cap by mouth three (3) times daily as needed (gout). 60 Cap 0    polyethylene glycol (MIRALAX) 17 gram packet Take 17 g by mouth daily.  aspirin delayed-release 81 mg tablet Take  by mouth daily.        Past Medical History:   Diagnosis Date    Arthritis     Hypercholesterolemia      Past Surgical History:   Procedure Laterality Date    HX COLONOSCOPY       No Known Allergies      REVIEW OF SYSTEMS:  General: negative for - chills or fever  ENT: negative for - headaches, nasal congestion or tinnitus  Respiratory: negative for - cough, hemoptysis, shortness of breath or wheezing  Cardiovascular : negative for - chest pain, edema, palpitations or shortness of breath  Gastrointestinal: negative for - abdominal pain, blood in stools, heartburn or nausea/vomiting  Genito-Urinary: no dysuria, trouble voiding, or hematuria  Musculoskeletal: negative for - gait disturbance, joint pain, joint stiffness or joint swelling  Neurological: no TIA or stroke symptoms  Hematologic: no bruises, no bleeding, no swollen glands  Integument: no lumps, mole changes, nail changes or rash  Endocrine: no malaise/lethargy or unexpected weight changes      Social History     Socioeconomic History    Marital status:      Spouse name: Not on file    Number of children: 1    Years of education: Not on file    Highest education level: Not on file   Occupational History    Occupation: Former [employer] worker   Tobacco Use    Smoking status: Current Some Day Smoker    Smokeless tobacco: Never Used   Substance and Sexual Activity    Alcohol use: Yes     Alcohol/week: 0.8 standard drinks     Types: 1 Cans of beer per week    Drug use: No    Sexual activity: Yes     Partners: Female     Family History   Problem Relation Age of Onset    Cancer Mother        OBJECTIVE:    Visit Vitals  /77   Pulse 80   Temp 98 °F (36.7 °C)   Resp 16   Ht 5' 10\" (1.778 m)   Wt 200 lb 11.2 oz (91 kg)   SpO2 99%   BMI 28.80 kg/m²     CONSTITUTIONAL: well , well nourished, appears age appropriate  EYES: perrla, eom intact  ENMT:moist mucous membranes, pharynx clear  NECK: supple. Thyroid normal  RESPIRATORY: Chest: clear to ascultation and percussion   CARDIOVASCULAR: Heart: regular rate and rhythm  GASTROINTESTINAL: Abdomen: soft, bowel sounds active  HEMATOLOGIC: no pathological lymph nodes palpated  MUSCULOSKELETAL: Extremities: no edema, pulse 1+   INTEGUMENT: No unusual rashes or suspicious skin lesions noted. Nails appear normal.  NEUROLOGIC: non-focal exam   MENTAL STATUS: alert and oriented, appropriate affect      ASSESSMENT:  1. Decreased vision of left eye    2. Other rhinitis    3. 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  Overweight (BMI 25.0-29.9)    5. Physical exam    6. Dyslipidemia        PLAN:    1. He will be referred to an ophthalmologist for reduction in vision in left eye, which I suspect is related to cataract. 2. He has a rhinitis, which is seasonal.  It is not allergically mediated, however. He will be started back on Fluticasone nasal spray, two sprays each nostril daily. 3. For his gout, he will continue his Allopurinol 300 mg daily. 4. I encouraged him to continue weight reduction. This can be accomplished by eating meals, eliminating snacks and avoiding the consumption of processed carbohydrates. .  Orders Placed This Encounter    APOLIPOPROTEIN B    CBC WITH AUTOMATED DIFF    CRP, HIGH SENSITIVITY    LIPID PANEL    METABOLIC PANEL, COMPREHENSIVE    PROSTATE SPECIFIC AG    URINALYSIS W/ RFLX MICROSCOPIC    REFERRAL TO OPHTHALMOLOGY    fluticasone propionate (FLONASE) 50 mcg/actuation nasal spray    allopurinol (ZYLOPRIM) 300 mg tablet    indomethacin (INDOCIN) 50 mg capsule         Follow-up and Dispositions    · Return in about 4 months (around 4/30/2020).            Altagracia Simeon MD

## 2019-12-31 NOTE — PROGRESS NOTES
Chief Complaint   Patient presents with    Cold Symptoms     Patient is here for a cold. 1. Have you been to the ER, urgent care clinic since your last visit? Hospitalized since your last visit? No    2. Have you seen or consulted any other health care providers outside of the [address] [place] [address] since your last visit? Include any pap smears or colon screening.  No

## 2020-01-01 LAB
ALBUMIN SERPL-MCNC: 4.4 G/DL (ref 3.6–4.8)
ALBUMIN/GLOB SERPL: 1.4 {RATIO} (ref 1.2–2.2)
ALP SERPL-CCNC: 52 IU/L (ref 39–117)
ALT SERPL-CCNC: 18 IU/L (ref 0–44)
APO B SERPL-MCNC: 110 MG/DL
APPEARANCE UR: CLEAR
AST SERPL-CCNC: 20 IU/L (ref 0–40)
BASOPHILS # BLD AUTO: 0 X10E3/UL (ref 0–0.2)
BASOPHILS NFR BLD AUTO: 0 %
BILIRUB SERPL-MCNC: 0.4 MG/DL (ref 0–1.2)
BILIRUB UR QL STRIP: NEGATIVE
BUN SERPL-MCNC: 17 MG/DL (ref 8–27)
BUN/CREAT SERPL: 18 (ref 10–24)
CALCIUM SERPL-MCNC: 9.8 MG/DL (ref 8.6–10.2)
CHLORIDE SERPL-SCNC: 102 MMOL/L (ref 96–106)
CHOLEST SERPL-MCNC: 176 MG/DL (ref 100–199)
CO2 SERPL-SCNC: 20 MMOL/L (ref 20–29)
COLOR UR: YELLOW
CREAT SERPL-MCNC: 0.94 MG/DL (ref 0.76–1.27)
CRP SERPL HS-MCNC: 6.31 MG/L (ref 0–3)
EOSINOPHIL # BLD AUTO: 0 X10E3/UL (ref 0–0.4)
EOSINOPHIL NFR BLD AUTO: 0 %
ERYTHROCYTE [DISTWIDTH] IN BLOOD BY AUTOMATED COUNT: 12.9 % (ref 12.3–15.4)
GLOBULIN SER CALC-MCNC: 3.1 G/DL (ref 1.5–4.5)
GLUCOSE SERPL-MCNC: 111 MG/DL (ref 65–99)
GLUCOSE UR QL: NEGATIVE
HCT VFR BLD AUTO: 39.7 % (ref 37.5–51)
HDLC SERPL-MCNC: 40 MG/DL
HGB BLD-MCNC: 14 G/DL (ref 13–17.7)
HGB UR QL STRIP: NEGATIVE
IMM GRANULOCYTES # BLD AUTO: 0 X10E3/UL (ref 0–0.1)
IMM GRANULOCYTES NFR BLD AUTO: 0 %
KETONES UR QL STRIP: NEGATIVE
LDLC SERPL CALC-MCNC: 117 MG/DL (ref 0–99)
LEUKOCYTE ESTERASE UR QL STRIP: NEGATIVE
LYMPHOCYTES # BLD AUTO: 1.9 X10E3/UL (ref 0.7–3.1)
LYMPHOCYTES NFR BLD AUTO: 18 %
MCH RBC QN AUTO: 32 PG (ref 26.6–33)
MCHC RBC AUTO-ENTMCNC: 35.3 G/DL (ref 31.5–35.7)
MCV RBC AUTO: 91 FL (ref 79–97)
MICRO URNS: NORMAL
MONOCYTES # BLD AUTO: 1.2 X10E3/UL (ref 0.1–0.9)
MONOCYTES NFR BLD AUTO: 11 %
NEUTROPHILS # BLD AUTO: 7.2 X10E3/UL (ref 1.4–7)
NEUTROPHILS NFR BLD AUTO: 71 %
NITRITE UR QL STRIP: NEGATIVE
PH UR STRIP: 5 [PH] (ref 5–7.5)
PLATELET # BLD AUTO: 275 X10E3/UL (ref 150–450)
POTASSIUM SERPL-SCNC: 4 MMOL/L (ref 3.5–5.2)
PROT SERPL-MCNC: 7.5 G/DL (ref 6–8.5)
PROT UR QL STRIP: NEGATIVE
PSA SERPL-MCNC: 1.2 NG/ML (ref 0–4)
RBC # BLD AUTO: 4.37 X10E6/UL (ref 4.14–5.8)
SODIUM SERPL-SCNC: 139 MMOL/L (ref 134–144)
SP GR UR: 1.01 (ref 1–1.03)
TRIGL SERPL-MCNC: 94 MG/DL (ref 0–149)
UROBILINOGEN UR STRIP-MCNC: 0.2 MG/DL (ref 0.2–1)
VLDLC SERPL CALC-MCNC: 19 MG/DL (ref 5–40)
WBC # BLD AUTO: 10.3 X10E3/UL (ref 3.4–10.8)

## 2020-02-02 DIAGNOSIS — E78.5 DYSLIPIDEMIA: Primary | ICD-10-CM

## 2020-02-02 RX ORDER — ROSUVASTATIN CALCIUM 10 MG/1
10 TABLET, COATED ORAL DAILY
Qty: 30 TAB | Refills: 11 | Status: SHIPPED | OUTPATIENT
Start: 2020-02-02 | End: 2021-01-26 | Stop reason: SDUPTHER

## 2020-02-10 ENCOUNTER — OFFICE VISIT (OUTPATIENT)
Dept: INTERNAL MEDICINE CLINIC | Age: 60
End: 2020-02-10

## 2020-02-10 DIAGNOSIS — E78.5 DYSLIPIDEMIA: ICD-10-CM

## 2020-02-10 DIAGNOSIS — E66.3 OVERWEIGHT (BMI 25.0-29.9): ICD-10-CM

## 2020-02-10 DIAGNOSIS — E04.9 NODULAR GOITER: ICD-10-CM

## 2020-02-10 DIAGNOSIS — M1A.4690 OTHER SECONDARY CHRONIC GOUT OF KNEE WITHOUT TOPHUS, UNSPECIFIED LATERALITY: ICD-10-CM

## 2020-02-10 DIAGNOSIS — Z01.818 PREOPERATIVE EXAMINATION: Primary | ICD-10-CM

## 2020-02-10 NOTE — PROGRESS NOTES
Chief Complaint   Patient presents with    Pre-op Exam     Patient scheduled for cataract surgery on 2/2420.     1. Have you been to the ER, urgent care clinic since your last visit? Hospitalized since your last visit? No    2. Have you seen or consulted any other health care providers outside of the [address] [place] [address] since your last visit? Include any pap smears or colon screening.  No

## 2020-02-16 VITALS
DIASTOLIC BLOOD PRESSURE: 80 MMHG | BODY MASS INDEX: 29.86 KG/M2 | HEIGHT: 70 IN | OXYGEN SATURATION: 98 % | RESPIRATION RATE: 16 BRPM | SYSTOLIC BLOOD PRESSURE: 124 MMHG | HEART RATE: 65 BPM | WEIGHT: 208.6 LBS | TEMPERATURE: 98.1 F

## 2020-02-16 PROBLEM — E78.5 DYSLIPIDEMIA: Status: ACTIVE | Noted: 2020-02-16

## 2020-02-16 PROBLEM — E04.9 NODULAR GOITER: Status: ACTIVE | Noted: 2020-02-16

## 2020-02-16 PROBLEM — E66.3 OVERWEIGHT (BMI 25.0-29.9): Status: ACTIVE | Noted: 2020-02-16

## 2020-02-16 NOTE — PROGRESS NOTES
[address] [place] [address] and Primary Care  [place] [address]  [address] [place] [address]  Phone:  [phone]  Fax: [phone]       Chief Complaint   Patient presents with    Pre-op Exam     Patient scheduled for cataract surgery on 2/2420. Gladys Noriega SUBJECTIVE:    Brice Castillo is a 56 y.o. male Comes in for return visit planning to have cataract surgery on the 24th of this month. He has a symptomatic cataract. He states he has been well otherwise. He has reduced his alcohol consumption significantly. There has been no meaningful weight loss since I last saw him. He has a past history of dyslipidemia. Current Outpatient Medications   Medication Sig Dispense Refill    rosuvastatin (CRESTOR) 10 mg tablet Take 1 Tab by mouth daily. 30 Tab 11    fluticasone propionate (FLONASE) 50 mcg/actuation nasal spray 2 sprays in each nostril daily 1 Bottle 11    allopurinol (ZYLOPRIM) 300 mg tablet Take 1 Tab by mouth daily. 30 Tab 11    indomethacin (INDOCIN) 50 mg capsule Take 1 Cap by mouth three (3) times daily as needed (gout). 60 Cap 0    polyethylene glycol (MIRALAX) 17 gram packet Take 17 g by mouth daily.  aspirin delayed-release 81 mg tablet Take  by mouth daily.        Past Medical History:   Diagnosis Date    Arthritis     Hypercholesterolemia      Past Surgical History:   Procedure Laterality Date    HX COLONOSCOPY       No Known Allergies      REVIEW OF SYSTEMS:  General: negative for - chills or fever  ENT: negative for - headaches, nasal congestion or tinnitus  Respiratory: negative for - cough, hemoptysis, shortness of breath or wheezing  Cardiovascular : negative for - chest pain, edema, palpitations or shortness of breath  Gastrointestinal: negative for - abdominal pain, blood in stools, heartburn or nausea/vomiting  Genito-Urinary: no dysuria, trouble voiding, or hematuria  Musculoskeletal: negative for - gait disturbance, joint pain, joint stiffness or joint swelling  Neurological: no TIA or stroke symptoms  Hematologic: no bruises, no bleeding, no swollen glands  Integument: no lumps, mole changes, nail changes or rash  Endocrine: no malaise/lethargy or unexpected weight changes      Social History     Socioeconomic History    Marital status:      Spouse name: Not on file    Number of children: 1    Years of education: Not on file    Highest education level: Not on file   Occupational History    Occupation: Former HVAC worker   Tobacco Use    Smoking status: Current Some Day Smoker    Smokeless tobacco: Never Used   Substance and Sexual Activity    Alcohol use: Yes     Alcohol/week: 0.8 standard drinks     Types: 1 Cans of beer per week    Drug use: No    Sexual activity: Yes     Partners: Female     Family History   Problem Relation Age of Onset    Cancer Mother        OBJECTIVE:    Visit Vitals  /80   Pulse 65   Temp 98.1 °F (36.7 °C) (Oral)   Resp 16   Ht 5' 10\" (1.778 m)   Wt 208 lb 9.6 oz (94.6 kg)   SpO2 98%   BMI 29.93 kg/m²     CONSTITUTIONAL: well , well nourished, appears age appropriate  EYES: perrla, eom intact  ENMT:moist mucous membranes, pharynx clear  NECK: supple. Nodular goiter  RESPIRATORY: Chest: clear to ascultation and percussion   CARDIOVASCULAR: Heart: regular rate and rhythm  GASTROINTESTINAL: Abdomen: soft, bowel sounds active  HEMATOLOGIC: no pathological lymph nodes palpated  MUSCULOSKELETAL: Extremities: no edema, pulse 1+   INTEGUMENT: No unusual rashes or suspicious skin lesions noted. Nails appear normal.  NEUROLOGIC: non-focal exam   MENTAL STATUS: alert and oriented, appropriate affect      ASSESSMENT:  1. Preoperative examination    2. Dyslipidemia    3. 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 Overweight (BMI 25.0-29.9)    5. Nodular goiter        PLAN:    1. The patient is stable based on his physical examination for his planned surgery.   His form will be completed. 2. He will continue statin as prescribed in view of his primary cardiovascular risk prevention status. 3. His gout is stable. He has not had any major flare. He is on no chronic suppressive therapy because his flare-ups are quite sporadic. 4. He does need to lose weight. This can be accomplished by eating meals, eliminating snacks and avoiding the consumption of processed carbohydrates. 5. He has a nodular goiter. I will continue to monitor this and will obtain a thyroid ultrasound on his return visit. Follow-up and Dispositions    · Return keep old apt.            Min Gray MD

## 2021-01-26 DIAGNOSIS — E78.5 DYSLIPIDEMIA: ICD-10-CM

## 2021-01-26 DIAGNOSIS — M1A.4690 OTHER SECONDARY CHRONIC GOUT OF KNEE WITHOUT TOPHUS, UNSPECIFIED LATERALITY: ICD-10-CM

## 2021-01-26 DIAGNOSIS — J31.0 OTHER RHINITIS: ICD-10-CM

## 2021-01-26 RX ORDER — INDOMETHACIN 50 MG/1
50 CAPSULE ORAL
Qty: 60 CAP | Refills: 0 | Status: SHIPPED | OUTPATIENT
Start: 2021-01-26 | End: 2021-02-17 | Stop reason: SDUPTHER

## 2021-01-26 RX ORDER — ROSUVASTATIN CALCIUM 10 MG/1
10 TABLET, COATED ORAL DAILY
Qty: 30 TAB | Refills: 11 | Status: SHIPPED | OUTPATIENT
Start: 2021-01-26 | End: 2021-02-17 | Stop reason: SDUPTHER

## 2021-01-26 RX ORDER — FLUTICASONE PROPIONATE 50 MCG
SPRAY, SUSPENSION (ML) NASAL
Qty: 1 BOTTLE | Refills: 11 | Status: SHIPPED | OUTPATIENT
Start: 2021-01-26 | End: 2021-02-17 | Stop reason: SDUPTHER

## 2021-03-01 DIAGNOSIS — E78.5 DYSLIPIDEMIA: ICD-10-CM

## 2021-03-01 DIAGNOSIS — M1A.4690 OTHER SECONDARY CHRONIC GOUT OF KNEE WITHOUT TOPHUS, UNSPECIFIED LATERALITY: ICD-10-CM

## 2021-03-01 RX ORDER — ALLOPURINOL 300 MG/1
300 TABLET ORAL DAILY
Qty: 30 TAB | Refills: 11 | Status: SHIPPED | OUTPATIENT
Start: 2021-03-01 | End: 2022-01-25

## 2021-03-01 RX ORDER — ROSUVASTATIN CALCIUM 10 MG/1
10 TABLET, COATED ORAL DAILY
Qty: 30 TAB | Refills: 11 | Status: SHIPPED | OUTPATIENT
Start: 2021-03-01 | End: 2022-01-25

## 2022-01-25 ENCOUNTER — OFFICE VISIT (OUTPATIENT)
Dept: INTERNAL MEDICINE CLINIC | Age: 62
End: 2022-01-25
Payer: MEDICAID

## 2022-01-25 VITALS
WEIGHT: 195.8 LBS | HEART RATE: 78 BPM | DIASTOLIC BLOOD PRESSURE: 72 MMHG | BODY MASS INDEX: 28.03 KG/M2 | HEIGHT: 70 IN | SYSTOLIC BLOOD PRESSURE: 148 MMHG | TEMPERATURE: 98.4 F | RESPIRATION RATE: 18 BRPM | OXYGEN SATURATION: 98 %

## 2022-01-25 DIAGNOSIS — J30.0 VASOMOTOR RHINITIS: ICD-10-CM

## 2022-01-25 DIAGNOSIS — M1A.4690 OTHER SECONDARY CHRONIC GOUT OF KNEE WITHOUT TOPHUS, UNSPECIFIED LATERALITY: ICD-10-CM

## 2022-01-25 DIAGNOSIS — E78.5 DYSLIPIDEMIA: ICD-10-CM

## 2022-01-25 DIAGNOSIS — M54.16 LUMBAR RADICULOPATHY: Primary | ICD-10-CM

## 2022-01-25 PROCEDURE — 99213 OFFICE O/P EST LOW 20 MIN: CPT | Performed by: INTERNAL MEDICINE

## 2022-01-25 RX ORDER — ROSUVASTATIN CALCIUM 10 MG/1
10 TABLET, COATED ORAL DAILY
Qty: 90 TABLET | Refills: 3 | Status: SHIPPED | OUTPATIENT
Start: 2022-01-25

## 2022-01-25 RX ORDER — ALLOPURINOL 300 MG/1
300 TABLET ORAL DAILY
Qty: 90 TABLET | Refills: 3 | Status: SHIPPED | OUTPATIENT
Start: 2022-01-25

## 2022-01-25 NOTE — PROGRESS NOTES
Chief Complaint   Patient presents with    Physical         1. Have you been to the ER, urgent care clinic since your last visit? Hospitalized since your last visit? No    2. Have you seen or consulted any other health care providers outside of the [address] [place] [address] since your last visit? Include any pap smears or colon screening.  No

## 2022-01-27 DIAGNOSIS — J31.0 OTHER RHINITIS: ICD-10-CM

## 2022-01-28 RX ORDER — FLUTICASONE PROPIONATE 50 MCG
SPRAY, SUSPENSION (ML) NASAL
Qty: 1 EACH | Refills: 11 | Status: SHIPPED | OUTPATIENT
Start: 2022-01-28

## 2022-02-01 DIAGNOSIS — M1A.4690 OTHER SECONDARY CHRONIC GOUT OF KNEE WITHOUT TOPHUS, UNSPECIFIED LATERALITY: ICD-10-CM

## 2022-02-02 RX ORDER — INDOMETHACIN 50 MG/1
50 CAPSULE ORAL
Qty: 60 CAPSULE | Refills: 3 | Status: SHIPPED | OUTPATIENT
Start: 2022-02-02 | End: 2022-08-14

## 2022-02-06 NOTE — PROGRESS NOTES
Chief Complaint   Patient presents with    Physical   .      SUBECTIVE:    Pratik Young is a 57 y.o. male comes in for return visit stating that he has done reasonably well. He complains of fullness in his right ear. He thinks he may have cerumen impacting. He also has pain in his left hip radiating down his left leg. He has had this now for the last several weeks. He has a past history of gout and dyslipidemia. Current Outpatient Medications   Medication Sig Dispense Refill    allopurinoL (ZYLOPRIM) 300 mg tablet TAKE 1 TAB BY MOUTH DAILY. NEED APPT 90 Tablet 3    rosuvastatin (CRESTOR) 10 mg tablet TAKE 1 TAB BY MOUTH DAILY. NEED APPT 90 Tablet 3    indomethacin (INDOCIN) 50 mg capsule Take 1 Capsule by mouth three (3) times daily as needed (gout). Need appt 60 Capsule 3    fluticasone propionate (FLONASE) 50 mcg/actuation nasal spray 2 sprays in each nostril daily. need appt 1 Each 11    polyethylene glycol (MIRALAX) 17 gram packet Take 17 g by mouth daily.  aspirin delayed-release 81 mg tablet Take  by mouth daily.  (Patient not taking: Reported on 1/25/2022)       Past Medical History:   Diagnosis Date    Arthritis     Hypercholesterolemia      Past Surgical History:   Procedure Laterality Date    HX COLONOSCOPY       No Known Allergies    REVIEW OF SYSTEMS:  Review of Systems - Negative except   ENT ROS: negative for - headaches, hearing change, nasal congestion, oral lesions, tinnitus, visual changes or   Respiratory ROS: no cough, shortness of breath, or wheezing  Cardiovascular ROS: no chest pain or dyspnea on exertion  Gastrointestinal ROS: no abdominal pain, change in bowel habits, or black or blood  Genito-Urinary ROS: no dysuria, trouble voiding, or hematuria  Musculoskeletal ROS: negative  Neurological ROS: no TIA or stroke symptoms      Social History     Socioeconomic History    Marital status:     Number of children: 1   Occupational History    Occupation: Former HVAC worker   Tobacco Use    Smoking status: Current Some Day Smoker    Smokeless tobacco: Never Used   Vaping Use    Vaping Use: Never used   Substance and Sexual Activity    Alcohol use: Yes     Alcohol/week: 0.8 standard drinks     Types: 1 Cans of beer per week    Drug use: No    Sexual activity: Yes     Partners: Female   r  Family History   Problem Relation Age of Onset    Cancer Mother        OBJECTIVE:  Visit Vitals  BP (!) 148/72   Pulse 78   Temp 98.4 °F (36.9 °C) (Oral)   Resp 18   Ht 5' 10\" (1.778 m)   Wt 195 lb 12.8 oz (88.8 kg)   SpO2 98%   BMI 28.09 kg/m²     ENT: perrla,  eom intact  NECK: supple. Thyroid normal, no JVD  CHEST: clear to ascultation and percussion   HEART: regular rate and rhythm  ABD: soft, bowel sounds active,   EXTREMITIES: no edema, pulse 1+  INTEGUMENT: clear  Neurological: Positive straight leg raising left leg      ASSESSMENT:  1. Lumbar radiculopathy    2. Vasomotor rhinitis        PLAN:   1. The patient has what appears to be a lumbar radiculopathy. Straight leg raising appears to be positive on the left. Empiric treatment with prednisone 80 mg p.c. dinner for the next five days if possible. 2. He appears to have a vasomotor rhinitis. There is no cerumen impaction. I suggest fluticasone nasal spray, two sprays in each nostril daily. This however is predominately involving his right ear. Follow-up and Dispositions    · Return in about 3 weeks (around 2/15/2022).            Cristobal Hoyt MD

## 2022-02-15 ENCOUNTER — OFFICE VISIT (OUTPATIENT)
Dept: INTERNAL MEDICINE CLINIC | Age: 62
End: 2022-02-15
Payer: MEDICAID

## 2022-02-15 VITALS
OXYGEN SATURATION: 98 % | DIASTOLIC BLOOD PRESSURE: 80 MMHG | HEART RATE: 71 BPM | BODY MASS INDEX: 28.96 KG/M2 | WEIGHT: 202.3 LBS | RESPIRATION RATE: 18 BRPM | TEMPERATURE: 98.1 F | SYSTOLIC BLOOD PRESSURE: 120 MMHG | HEIGHT: 70 IN

## 2022-02-15 DIAGNOSIS — E78.5 DYSLIPIDEMIA: Primary | ICD-10-CM

## 2022-02-15 DIAGNOSIS — J43.1 PANLOBULAR EMPHYSEMA (HCC): ICD-10-CM

## 2022-02-15 DIAGNOSIS — M25.552 LEFT HIP PAIN: ICD-10-CM

## 2022-02-15 DIAGNOSIS — M1A.4690 OTHER SECONDARY CHRONIC GOUT OF KNEE WITHOUT TOPHUS, UNSPECIFIED LATERALITY: ICD-10-CM

## 2022-02-15 DIAGNOSIS — E66.3 OVERWEIGHT (BMI 25.0-29.9): ICD-10-CM

## 2022-02-15 DIAGNOSIS — F10.10 ALCOHOL ABUSE: ICD-10-CM

## 2022-02-15 PROCEDURE — 99214 OFFICE O/P EST MOD 30 MIN: CPT | Performed by: INTERNAL MEDICINE

## 2022-02-15 NOTE — PROGRESS NOTES
Chief Complaint   Patient presents with    Hip Pain     follow up          1. Have you been to the ER, urgent care clinic since your last visit? Hospitalized since your last visit? No    2. Have you seen or consulted any other health care providers outside of the [address] [place] [address] since your last visit? Include any pap smears or colon screening.  No

## 2022-02-15 NOTE — PROGRESS NOTES
[address] [place] [address] and Primary Care  [place] [address]  [address] [facility] [address]  Phone:  [phone]  Fax: [phone]       Chief Complaint   Patient presents with    Hip Pain     follow up    . SUBJECTIVE:    Pratik Young is a 57 y.o. male comes in for return visit stating that he feels quite well. He is not having any chronic pain in his left hip as he previously did. There was also a question of gout and he has not had any recurrence in this typical area which was his first MTP joint. He takes his indomethacin as needed. He states his alcohol consumption has decreased significantly. He has a past history of dyslipidemia and tobacco abuse. Current Outpatient Medications   Medication Sig Dispense Refill    indomethacin (INDOCIN) 50 mg capsule Take 1 Capsule by mouth three (3) times daily as needed (gout). Need appt 60 Capsule 3    fluticasone propionate (FLONASE) 50 mcg/actuation nasal spray 2 sprays in each nostril daily. need appt 1 Each 11    allopurinoL (ZYLOPRIM) 300 mg tablet TAKE 1 TAB BY MOUTH DAILY. NEED APPT 90 Tablet 3    rosuvastatin (CRESTOR) 10 mg tablet TAKE 1 TAB BY MOUTH DAILY.  NEED APPT 90 Tablet 3     Past Medical History:   Diagnosis Date    Arthritis     Hypercholesterolemia      Past Surgical History:   Procedure Laterality Date    HX COLONOSCOPY       No Known Allergies      REVIEW OF SYSTEMS:  General: negative for - chills or fever  ENT: negative for - headaches, nasal congestion or tinnitus  Respiratory: negative for - cough, hemoptysis, shortness of breath or wheezing  Cardiovascular : negative for - chest pain, edema, palpitations or shortness of breath  Gastrointestinal: negative for - abdominal pain, blood in stools, heartburn or nausea/vomiting  Genito-Urinary: no dysuria, trouble voiding, or hematuria  Musculoskeletal: negative for - gait disturbance, joint pain, joint stiffness or joint swelling  Neurological: no TIA or stroke symptoms  Hematologic: no bruises, no bleeding, no swollen glands  Integument: no lumps, mole changes, nail changes or rash  Endocrine: no malaise/lethargy or unexpected weight changes      Social History     Socioeconomic History    Marital status:     Number of children: 1   Occupational History    Occupation: Former HVAC worker   Tobacco Use    Smoking status: Current Some Day Smoker    Smokeless tobacco: Never Used   Vaping Use    Vaping Use: Never used   Substance and Sexual Activity    Alcohol use: Yes     Alcohol/week: 0.8 standard drinks     Types: 1 Cans of beer per week    Drug use: No    Sexual activity: Yes     Partners: Female     Family History   Problem Relation Age of Onset    Cancer Mother        OBJECTIVE:    Visit Vitals  /80   Pulse 71   Temp 98.1 °F (36.7 °C) (Oral)   Resp 18   Ht 5' 10\" (1.778 m)   Wt 202 lb 4.8 oz (91.8 kg)   SpO2 98%   BMI 29.03 kg/m²     CONSTITUTIONAL: well , well nourished, appears age appropriate  EYES: perrla, eom intact  ENMT:moist mucous membranes, pharynx clear  NECK: supple. Thyroid normal  RESPIRATORY: Chest: clear to ascultation and percussion   CARDIOVASCULAR: Heart: regular rate and rhythm  GASTROINTESTINAL: Abdomen: soft, bowel sounds active  HEMATOLOGIC: no pathological lymph nodes palpated  MUSCULOSKELETAL: Extremities: no edema, pulse 1+   INTEGUMENT: No unusual rashes or suspicious skin lesions noted. Nails appear normal.  NEUROLOGIC: non-focal exam   MENTAL STATUS: alert and oriented, appropriate affect      ASSESSMENT:  1. Dyslipidemia    2. 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 Overweight (BMI 25.0-29.9)    4. Alcohol abuse    5. Left hip pain    6. Panlobular emphysema ([address]        PLAN:  1. The patient will continue statin as prescribed. He has a significantly increased cardiovascular risk. 2. His gout is reasonably stable with use of the indomethacin.   3. I suggest he minimize weight gain.  This can be accomplished by eating meals, eliminating snacks, and avoiding the consumption of processed carbohydrates. 4. His alcohol abuse needs to andrea. He is quite aware of this. He is making every effort he can to do so.  5. His left pain continues to improve also. 6. He really needs to discontinue cigarette smoking in view of the COPD but also from the proatherogenic status created by the behavior. Follow-up and Dispositions    · Return in about 4 months (around 6/15/2022).            Thomas Gregory MD

## 2022-02-20 PROBLEM — J43.1 PANLOBULAR EMPHYSEMA (HCC): Status: ACTIVE | Noted: 2022-02-20

## 2022-03-18 PROBLEM — E78.5 DYSLIPIDEMIA: Status: ACTIVE | Noted: 2020-02-16

## 2022-03-18 PROBLEM — M10.9 GOUT: Status: ACTIVE | Noted: 2017-12-12

## 2022-03-19 PROBLEM — E04.9 NODULAR GOITER: Status: ACTIVE | Noted: 2020-02-16

## 2022-03-19 PROBLEM — J30.9 ALLERGIC RHINITIS DUE TO ALLERGEN: Status: ACTIVE | Noted: 2017-12-12

## 2022-03-19 PROBLEM — F10.10 ALCOHOL ABUSE: Status: ACTIVE | Noted: 2017-05-22

## 2022-03-19 PROBLEM — J43.1 PANLOBULAR EMPHYSEMA (HCC): Status: ACTIVE | Noted: 2022-02-20

## 2022-03-20 PROBLEM — E66.3 OVERWEIGHT (BMI 25.0-29.9): Status: ACTIVE | Noted: 2020-02-16

## 2022-06-15 ENCOUNTER — OFFICE VISIT (OUTPATIENT)
Dept: INTERNAL MEDICINE CLINIC | Age: 62
End: 2022-06-15
Payer: MEDICAID

## 2022-06-15 VITALS
SYSTOLIC BLOOD PRESSURE: 134 MMHG | RESPIRATION RATE: 16 BRPM | HEIGHT: 70 IN | HEART RATE: 71 BPM | DIASTOLIC BLOOD PRESSURE: 74 MMHG | WEIGHT: 193.8 LBS | TEMPERATURE: 98.2 F | BODY MASS INDEX: 27.75 KG/M2 | OXYGEN SATURATION: 98 %

## 2022-06-15 DIAGNOSIS — M54.50 ACUTE MIDLINE LOW BACK PAIN WITHOUT SCIATICA: Primary | ICD-10-CM

## 2022-06-15 DIAGNOSIS — J43.1 PANLOBULAR EMPHYSEMA (HCC): ICD-10-CM

## 2022-06-15 DIAGNOSIS — E78.5 DYSLIPIDEMIA: ICD-10-CM

## 2022-06-15 DIAGNOSIS — E66.3 OVERWEIGHT (BMI 25.0-29.9): ICD-10-CM

## 2022-06-15 DIAGNOSIS — N40.0 BENIGN PROSTATIC HYPERPLASIA WITHOUT LOWER URINARY TRACT SYMPTOMS: ICD-10-CM

## 2022-06-15 DIAGNOSIS — E04.9 NODULAR GOITER: ICD-10-CM

## 2022-06-15 DIAGNOSIS — Z12.11 COLON CANCER SCREENING: ICD-10-CM

## 2022-06-15 DIAGNOSIS — Z00.00 PHYSICAL EXAM: ICD-10-CM

## 2022-06-15 PROCEDURE — 99396 PREV VISIT EST AGE 40-64: CPT | Performed by: INTERNAL MEDICINE

## 2022-06-15 PROCEDURE — 99214 OFFICE O/P EST MOD 30 MIN: CPT | Performed by: INTERNAL MEDICINE

## 2022-06-15 NOTE — PROGRESS NOTES
Chief Complaint   Patient presents with    Cholesterol Problem     4 month follow up          1. Have you been to the ER, urgent care clinic since your last visit? Hospitalized since your last visit? No    2. Have you seen or consulted any other health care providers outside of the [address] [place] [address] since your last visit? Include any pap smears or colon screening.  No

## 2022-06-16 LAB
ALBUMIN SERPL-MCNC: 3.9 G/DL (ref 3.5–5)
ALBUMIN/GLOB SERPL: 1.1 {RATIO} (ref 1.1–2.2)
ALP SERPL-CCNC: 784 U/L (ref 45–117)
ALT SERPL-CCNC: 55 U/L (ref 12–78)
ANION GAP SERPL CALC-SCNC: 4 MMOL/L (ref 5–15)
APPEARANCE UR: CLEAR
AST SERPL-CCNC: 31 U/L (ref 15–37)
BASOPHILS # BLD: 0 K/UL (ref 0–0.1)
BASOPHILS NFR BLD: 0 % (ref 0–1)
BILIRUB SERPL-MCNC: 0.3 MG/DL (ref 0.2–1)
BILIRUB UR QL: NEGATIVE
BUN SERPL-MCNC: 11 MG/DL (ref 6–20)
BUN/CREAT SERPL: 14 (ref 12–20)
CALCIUM SERPL-MCNC: 9.1 MG/DL (ref 8.5–10.1)
CHLORIDE SERPL-SCNC: 107 MMOL/L (ref 97–108)
CHOLEST SERPL-MCNC: 135 MG/DL
CO2 SERPL-SCNC: 30 MMOL/L (ref 21–32)
COLOR UR: NORMAL
COMMENT, HOLDF: NORMAL
CREAT SERPL-MCNC: 0.8 MG/DL (ref 0.7–1.3)
CRP SERPL HS-MCNC: 2.7 MG/L
DIFFERENTIAL METHOD BLD: ABNORMAL
EOSINOPHIL # BLD: 0.1 K/UL (ref 0–0.4)
EOSINOPHIL NFR BLD: 1 % (ref 0–7)
ERYTHROCYTE [DISTWIDTH] IN BLOOD BY AUTOMATED COUNT: 13.8 % (ref 11.5–14.5)
GLOBULIN SER CALC-MCNC: 3.4 G/DL (ref 2–4)
GLUCOSE SERPL-MCNC: 143 MG/DL (ref 65–100)
GLUCOSE UR STRIP.AUTO-MCNC: NEGATIVE MG/DL
HCT VFR BLD AUTO: 43.4 % (ref 36.6–50.3)
HDLC SERPL-MCNC: 49 MG/DL
HDLC SERPL: 2.8 {RATIO} (ref 0–5)
HGB BLD-MCNC: 14 G/DL (ref 12.1–17)
HGB UR QL STRIP: NEGATIVE
IMM GRANULOCYTES # BLD AUTO: 0.1 K/UL (ref 0–0.04)
IMM GRANULOCYTES NFR BLD AUTO: 2 % (ref 0–0.5)
KETONES UR QL STRIP.AUTO: NEGATIVE MG/DL
LDLC SERPL CALC-MCNC: 57.2 MG/DL (ref 0–100)
LEUKOCYTE ESTERASE UR QL STRIP.AUTO: NEGATIVE
LYMPHOCYTES # BLD: 2.1 K/UL (ref 0.8–3.5)
LYMPHOCYTES NFR BLD: 25 % (ref 12–49)
MCH RBC QN AUTO: 31.5 PG (ref 26–34)
MCHC RBC AUTO-ENTMCNC: 32.3 G/DL (ref 30–36.5)
MCV RBC AUTO: 97.5 FL (ref 80–99)
MONOCYTES # BLD: 0.9 K/UL (ref 0–1)
MONOCYTES NFR BLD: 11 % (ref 5–13)
NEUTS SEG # BLD: 4.9 K/UL (ref 1.8–8)
NEUTS SEG NFR BLD: 61 % (ref 32–75)
NITRITE UR QL STRIP.AUTO: NEGATIVE
NRBC # BLD: 0 K/UL (ref 0–0.01)
NRBC BLD-RTO: 0 PER 100 WBC
PH UR STRIP: 5 [PH] (ref 5–8)
PLATELET # BLD AUTO: 186 K/UL (ref 150–400)
PMV BLD AUTO: 10.6 FL (ref 8.9–12.9)
POTASSIUM SERPL-SCNC: 4.1 MMOL/L (ref 3.5–5.1)
PROT SERPL-MCNC: 7.3 G/DL (ref 6.4–8.2)
PROT UR STRIP-MCNC: NEGATIVE MG/DL
PSA SERPL-MCNC: 2324 NG/ML (ref 0.01–4)
RBC # BLD AUTO: 4.45 M/UL (ref 4.1–5.7)
SAMPLES BEING HELD,HOLD: NORMAL
SODIUM SERPL-SCNC: 141 MMOL/L (ref 136–145)
SP GR UR REFRACTOMETRY: 1.02 (ref 1–1.03)
TRIGL SERPL-MCNC: 144 MG/DL (ref ?–150)
UROBILINOGEN UR QL STRIP.AUTO: 0.2 EU/DL (ref 0.2–1)
VLDLC SERPL CALC-MCNC: 28.8 MG/DL
WBC # BLD AUTO: 8.1 K/UL (ref 4.1–11.1)

## 2022-06-17 LAB — APO B SERPL-MCNC: 80 MG/DL

## 2022-06-19 PROBLEM — M54.50 ACUTE MIDLINE LOW BACK PAIN WITHOUT SCIATICA: Status: ACTIVE | Noted: 2022-06-19

## 2022-06-19 NOTE — PROGRESS NOTES
[address] [place] [address] and Primary Care  [place] [address]  [address] [place] [address]  Phone:  [phone]  Fax: [phone]       Chief Complaint   Patient presents with    Cholesterol Problem     4 month follow up    . SUBJECTIVE:    Dbei Cho is a 57 y.o. male comes in for return visit complaining of intermittent low back pain. He has had this for the last several weeks. There has been no history of trauma. He states that his alcohol consumption has significantly decreased to the point where he is no longer drinking to excess. He has a past history of COPD, gout and dyslipidemia. Current Outpatient Medications   Medication Sig Dispense Refill    indomethacin (INDOCIN) 50 mg capsule Take 1 Capsule by mouth three (3) times daily as needed (gout). Need appt 60 Capsule 3    fluticasone propionate (FLONASE) 50 mcg/actuation nasal spray 2 sprays in each nostril daily. need appt 1 Each 11    allopurinoL (ZYLOPRIM) 300 mg tablet TAKE 1 TAB BY MOUTH DAILY. NEED APPT 90 Tablet 3    rosuvastatin (CRESTOR) 10 mg tablet TAKE 1 TAB BY MOUTH DAILY.  NEED APPT 90 Tablet 3     Past Medical History:   Diagnosis Date    Arthritis     Hypercholesterolemia      Past Surgical History:   Procedure Laterality Date    HX COLONOSCOPY       No Known Allergies      REVIEW OF SYSTEMS:  General: negative for - chills or fever  ENT: negative for - headaches, nasal congestion or tinnitus  Respiratory: negative for - cough, hemoptysis, shortness of breath or wheezing  Cardiovascular : negative for - chest pain, edema, palpitations or shortness of breath  Gastrointestinal: negative for - abdominal pain, blood in stools, heartburn or nausea/vomiting  Genito-Urinary: no dysuria, trouble voiding, or hematuria  Musculoskeletal: negative for - gait disturbance, joint pain, joint stiffness or joint swelling  Neurological: no TIA or stroke symptoms  Hematologic: no bruises, no bleeding, no swollen glands  Integument: no lumps, mole changes, nail changes or rash  Endocrine: no malaise/lethargy or unexpected weight changes      Social History     Socioeconomic History    Marital status:     Number of children: 1   Occupational History    Occupation: Former HVAC worker   Tobacco Use    Smoking status: Current Some Day Smoker    Smokeless tobacco: Never Used   Vaping Use    Vaping Use: Never used   Substance and Sexual Activity    Alcohol use: Yes     Alcohol/week: 0.8 standard drinks     Types: 1 Cans of beer per week    Drug use: No    Sexual activity: Yes     Partners: Female     Family History   Problem Relation Age of Onset    Cancer Mother        OBJECTIVE:    Visit Vitals  /74   Pulse 71   Temp 98.2 °F (36.8 °C) (Oral)   Resp 16   Ht 5' 10\" (1.778 m)   Wt 193 lb 12.8 oz (87.9 kg)   SpO2 98%   BMI 27.81 kg/m²     CONSTITUTIONAL: well , well nourished, appears age appropriate  EYES: perrla, eom intact  ENMT:moist mucous membranes, pharynx clear  NECK: supple. Nodular goiter  RESPIRATORY: Chest: clear to ascultation and percussion   CARDIOVASCULAR: Heart: regular rate and rhythm  GASTROINTESTINAL: Abdomen: soft, bowel sounds active  HEMATOLOGIC: no pathological lymph nodes palpated  MUSCULOSKELETAL: Extremities: no edema, pulse 1+   INTEGUMENT: No unusual rashes or suspicious skin lesions noted. Nails appear normal.  NEUROLOGIC: non-focal exam   MENTAL STATUS: alert and oriented, appropriate affect      ASSESSMENT:  1. Acute midline low back pain without sciatica    2. Nodular goiter    3. Panlobular emphysema ([address]    4. Dyslipidemia    5. Colon cancer screening    6. Benign prostatic hyperplasia without lower urinary tract symptoms    7. Overweight (BMI 25.0-29.9)    8. Physical exam        PLAN:  1. The patient has acute low back pain most likely related to mechanical dysfunction. Obviously there could be an element of lumbar spondylosis present. Symptomatic treatment for now.   2. He does have a nodular goiter, but this is unchanged. 3. I again encouraged him to discontinue cigarette smoking. This increases his cardiovascular risk, as well as oncogenic potential.  4. He remains on his statin in view of his significantly increased cardiovascular risk. 5. He needs to minimize weight gain. This can be accomplished by eating meals, eliminating snacks, and avoiding the consumption of processed carbohydrates. 6. He plans to return to his job in [employer] where he has worked for years.     .  Orders Placed This Encounter    OCCULT BLOOD IMMUNOASSAY,DIAGNOSTIC    APOLIPOPROTEIN B    CBC WITH AUTOMATED DIFF    CRP, HIGH SENSITIVITY    LIPID PANEL    METABOLIC PANEL, COMPREHENSIVE    PROSTATE SPECIFIC AG    URINALYSIS W/ RFLX MICROSCOPIC    SAMPLES BEING HELD               Juan Carlos Villa MD

## 2022-06-22 DIAGNOSIS — C61 PROSTATE CANCER (HCC): Primary | ICD-10-CM

## 2022-06-28 ENCOUNTER — TELEPHONE (OUTPATIENT)
Dept: INTERNAL MEDICINE CLINIC | Age: 62
End: 2022-06-28

## 2022-06-28 NOTE — TELEPHONE ENCOUNTER
----- Message from Adriana Kyle MD sent at 6/22/2022  9:26 PM EDT -----  Patient needs to see urologist ASAP. He also needs to have a bone scan done.

## 2022-06-29 ENCOUNTER — HOSPITAL ENCOUNTER (OUTPATIENT)
Dept: NUCLEAR MEDICINE | Age: 62
Discharge: HOME | End: 2022-06-29
Attending: INTERNAL MEDICINE
Payer: MEDICAID

## 2022-06-29 DIAGNOSIS — C61 PROSTATE CANCER (HCC): ICD-10-CM

## 2022-06-29 PROCEDURE — 78306 BONE IMAGING WHOLE BODY: CPT

## 2022-08-12 ENCOUNTER — TRANSCRIBE ORDER (OUTPATIENT)
Dept: SCHEDULING | Age: 62
End: 2022-08-12

## 2022-08-12 DIAGNOSIS — C61 PROSTATE CANCER (HCC): Primary | ICD-10-CM

## 2022-08-12 DIAGNOSIS — M1A.4690 OTHER SECONDARY CHRONIC GOUT OF KNEE WITHOUT TOPHUS, UNSPECIFIED LATERALITY: ICD-10-CM

## 2022-08-14 RX ORDER — INDOMETHACIN 50 MG/1
CAPSULE ORAL
Qty: 60 CAPSULE | Refills: 3 | Status: SHIPPED | OUTPATIENT
Start: 2022-08-14

## 2022-08-31 ENCOUNTER — HOSPITAL ENCOUNTER (OUTPATIENT)
Dept: PET IMAGING | Age: 62
Discharge: HOME | End: 2022-08-31
Attending: UROLOGY
Payer: MEDICAID

## 2022-08-31 VITALS — HEIGHT: 70 IN | BODY MASS INDEX: 27.63 KG/M2 | WEIGHT: 193 LBS

## 2022-08-31 DIAGNOSIS — C61 PROSTATE CANCER (HCC): ICD-10-CM

## 2022-08-31 PROCEDURE — 78815 PET IMAGE W/CT SKULL-THIGH: CPT

## 2022-12-15 ENCOUNTER — OFFICE VISIT (OUTPATIENT)
Dept: INTERNAL MEDICINE CLINIC | Age: 62
End: 2022-12-15
Payer: MEDICAID

## 2022-12-15 VITALS
HEIGHT: 70 IN | WEIGHT: 210.3 LBS | SYSTOLIC BLOOD PRESSURE: 124 MMHG | DIASTOLIC BLOOD PRESSURE: 75 MMHG | RESPIRATION RATE: 16 BRPM | OXYGEN SATURATION: 99 % | HEART RATE: 81 BPM | TEMPERATURE: 98.4 F | BODY MASS INDEX: 30.11 KG/M2

## 2022-12-15 DIAGNOSIS — E66.9 OBESITY (BMI 30.0-34.9): ICD-10-CM

## 2022-12-15 DIAGNOSIS — M1A.4690 OTHER SECONDARY CHRONIC GOUT OF KNEE WITHOUT TOPHUS, UNSPECIFIED LATERALITY: ICD-10-CM

## 2022-12-15 DIAGNOSIS — C61 PROSTATE CANCER (HCC): Primary | ICD-10-CM

## 2022-12-15 DIAGNOSIS — J43.1 PANLOBULAR EMPHYSEMA (HCC): ICD-10-CM

## 2022-12-15 DIAGNOSIS — E78.5 DYSLIPIDEMIA: ICD-10-CM

## 2022-12-15 PROCEDURE — 99214 OFFICE O/P EST MOD 30 MIN: CPT | Performed by: INTERNAL MEDICINE

## 2022-12-15 NOTE — PROGRESS NOTES
Jacky Kamara is a 57 y.o. male  Chief Complaint   Patient presents with    Follow-up     Patient here for follow up. 1. Have you been to the ER, urgent care clinic since your last visit? Hospitalized since your last visit? No    2. Have you seen or consulted any other health care providers outside of the [address] [place] [address] since your last visit? Include any pap smears or colon screening.  Yes Where: Urologist Reason for visit: PSA

## 2022-12-17 PROBLEM — E66.9 OBESITY (BMI 30.0-34.9): Status: ACTIVE | Noted: 2022-12-17

## 2022-12-17 PROBLEM — C61 PROSTATE CANCER (HCC): Status: ACTIVE | Noted: 2022-12-17

## 2022-12-17 PROBLEM — E66.811 OBESITY (BMI 30.0-34.9): Status: ACTIVE | Noted: 2022-12-17

## 2022-12-17 NOTE — PROGRESS NOTES
[address] [place] [address] and Primary Care  [place] [address]  [address] [facility] [address]  Phone:  [phone]  Fax: [phone]       Chief Complaint   Patient presents with    Follow-up     Patient here for follow up. .      SUBJECTIVE:    Linda Nelson is a 57 y.o. male comes in for return visit stating that he has been doing reasonably well. He is following up with his urologist Dr. Amy De Souza and is getting injections for his prostate cancer. He could not give me the medications name, but I assume is he is getting Lupron injections presumably. He has been having intermittent pain in his left leg, as well as his shoulder. His gout has been quite stable with no major flare up. Unfortunately he continues to smoke cigarettes. He does have existing COPD, but this is stable. Current Outpatient Medications   Medication Sig Dispense Refill    indomethacin (INDOCIN) 50 mg capsule TAKE 1 CAPSULE BY MOUTH THREE TIMES A DAY AS NEEDED 60 Capsule 3    fluticasone propionate (FLONASE) 50 mcg/actuation nasal spray 2 sprays in each nostril daily. need appt 1 Each 11    allopurinoL (ZYLOPRIM) 300 mg tablet TAKE 1 TAB BY MOUTH DAILY. NEED APPT 90 Tablet 3    rosuvastatin (CRESTOR) 10 mg tablet TAKE 1 TAB BY MOUTH DAILY.  NEED APPT 90 Tablet 3     Past Medical History:   Diagnosis Date    Arthritis     Hypercholesterolemia      Past Surgical History:   Procedure Laterality Date    HX COLONOSCOPY       No Known Allergies      REVIEW OF SYSTEMS:  General: negative for - chills or fever  ENT: negative for - headaches, nasal congestion or tinnitus  Respiratory: negative for - cough, hemoptysis, shortness of breath or wheezing  Cardiovascular : negative for - chest pain, edema, palpitations or shortness of breath  Gastrointestinal: negative for - abdominal pain, blood in stools, heartburn or nausea/vomiting  Genito-Urinary: no dysuria, trouble voiding, or hematuria  Musculoskeletal: negative for - gait disturbance, joint pain, joint stiffness or joint swelling  Neurological: no TIA or stroke symptoms  Hematologic: no bruises, no bleeding, no swollen glands  Integument: no lumps, mole changes, nail changes or rash  Endocrine: no malaise/lethargy or unexpected weight changes      Social History     Socioeconomic History    Marital status:     Number of children: 1   Occupational History    Occupation: Former HVAC worker   Tobacco Use    Smoking status: Some Days    Smokeless tobacco: Never   Vaping Use    Vaping Use: Never used   Substance and Sexual Activity    Alcohol use: Yes     Alcohol/week: 0.8 standard drinks     Types: 1 Cans of beer per week    Drug use: No    Sexual activity: Yes     Partners: Female     Family History   Problem Relation Age of Onset    Cancer Mother        OBJECTIVE:    Visit Vitals  /75   Pulse 81   Temp 98.4 °F (36.9 °C) (Oral)   Resp 16   Ht 5' 10\" (1.778 m)   Wt 210 lb 4.8 oz (95.4 kg)   SpO2 99%   BMI 30.17 kg/m²     CONSTITUTIONAL: well , well nourished, appears age appropriate  EYES: perrla, eom intact  ENMT:moist mucous membranes, pharynx clear  NECK: supple. Thyroid normal  RESPIRATORY: Chest: clear to ascultation and percussion   CARDIOVASCULAR: Heart: regular rate and rhythm  GASTROINTESTINAL: Abdomen: soft, bowel sounds active  HEMATOLOGIC: no pathological lymph nodes palpated  MUSCULOSKELETAL: Extremities: no edema, pulse 1+   INTEGUMENT: No unusual rashes or suspicious skin lesions noted. Nails appear normal.  NEUROLOGIC: non-focal exam   MENTAL STATUS: alert and oriented, appropriate affect      ASSESSMENT:  1. Prostate cancer ([address]    2. 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 Dyslipidemia    4. Obesity (BMI 30.0-34.9)    5. Panlobular emphysema ([address]        PLAN:  1. The patient does have prostate cancer with rather impressive PSA values.   He will continue to follow up with is urologist.  2. His gout is stable and he remains on his allopurinol. 3. His overall cardiovascular risk is modest to high given the fact that he is 57years of age and has existing comorbidities with principal one being cigarette smoking. He remains on his statin as prescribed. His dose probably needs to be increased. 4. He is maintaining his weight nicely. 5. I again encouraged him to discontinue cigarette smoking. Addendum:    I think the pain that he is having in his shoulder which is actually getting better is most likely related to a cervical radiculopathy. For now nothing more needs to be done. Follow-up and Dispositions    Return in about 6 months (around 6/15/2023).            David Brooks MD

## 2023-01-10 ENCOUNTER — TELEPHONE (OUTPATIENT)
Dept: INTERNAL MEDICINE CLINIC | Age: 63
End: 2023-01-10

## 2023-01-10 ENCOUNTER — TRANSCRIBE ORDER (OUTPATIENT)
Dept: SCHEDULING | Age: 63
End: 2023-01-10

## 2023-01-10 DIAGNOSIS — Z79.818 USE OF LEUPROLIDE ACETATE (LUPRON): Primary | ICD-10-CM

## 2023-01-10 NOTE — TELEPHONE ENCOUNTER
----- Message from Tanya Acevedo MD sent at 1/8/2023  2:38 PM EST -----  Asked patient if he ever had a colonoscopy----his stools are positive for blood but we can postpone this until he has been treated for his prostate cancer for a while

## 2023-02-27 ENCOUNTER — HOSPITAL ENCOUNTER (OUTPATIENT)
Dept: BONE DENSITY | Age: 63
Discharge: HOME | End: 2023-02-27
Attending: UROLOGY
Payer: MEDICARE

## 2023-02-27 DIAGNOSIS — Z79.818 USE OF LEUPROLIDE ACETATE (LUPRON): ICD-10-CM

## 2023-02-27 PROCEDURE — 77080 DXA BONE DENSITY AXIAL: CPT

## 2023-06-16 SDOH — ECONOMIC STABILITY: FOOD INSECURITY: WITHIN THE PAST 12 MONTHS, YOU WORRIED THAT YOUR FOOD WOULD RUN OUT BEFORE YOU GOT MONEY TO BUY MORE.: NEVER TRUE

## 2023-06-16 SDOH — ECONOMIC STABILITY: INCOME INSECURITY: HOW HARD IS IT FOR YOU TO PAY FOR THE VERY BASICS LIKE FOOD, HOUSING, MEDICAL CARE, AND HEATING?: NOT VERY HARD

## 2023-06-16 SDOH — ECONOMIC STABILITY: TRANSPORTATION INSECURITY
IN THE PAST 12 MONTHS, HAS LACK OF TRANSPORTATION KEPT YOU FROM MEETINGS, WORK, OR FROM GETTING THINGS NEEDED FOR DAILY LIVING?: NO

## 2023-06-16 SDOH — ECONOMIC STABILITY: HOUSING INSECURITY
IN THE LAST 12 MONTHS, WAS THERE A TIME WHEN YOU DID NOT HAVE A STEADY PLACE TO SLEEP OR SLEPT IN A SHELTER (INCLUDING NOW)?: NO

## 2023-06-16 SDOH — ECONOMIC STABILITY: FOOD INSECURITY: WITHIN THE PAST 12 MONTHS, THE FOOD YOU BOUGHT JUST DIDN'T LAST AND YOU DIDN'T HAVE MONEY TO GET MORE.: NEVER TRUE

## 2023-06-19 ENCOUNTER — OFFICE VISIT (OUTPATIENT)
Facility: CLINIC | Age: 63
End: 2023-06-19
Payer: MEDICARE

## 2023-06-19 VITALS
TEMPERATURE: 98.2 F | BODY MASS INDEX: 31.5 KG/M2 | WEIGHT: 220 LBS | HEART RATE: 82 BPM | RESPIRATION RATE: 20 BRPM | OXYGEN SATURATION: 100 % | DIASTOLIC BLOOD PRESSURE: 70 MMHG | SYSTOLIC BLOOD PRESSURE: 123 MMHG | HEIGHT: 70 IN

## 2023-06-19 DIAGNOSIS — E66.9 OBESITY (BMI 30.0-34.9): ICD-10-CM

## 2023-06-19 DIAGNOSIS — M62.512 ATROPHY OF MUSCLE OF LEFT SHOULDER: ICD-10-CM

## 2023-06-19 DIAGNOSIS — E78.5 DYSLIPIDEMIA: ICD-10-CM

## 2023-06-19 DIAGNOSIS — M75.02 ADHESIVE CAPSULITIS OF LEFT SHOULDER: Primary | ICD-10-CM

## 2023-06-19 DIAGNOSIS — M10.9 GOUT, UNSPECIFIED CAUSE, UNSPECIFIED CHRONICITY, UNSPECIFIED SITE: ICD-10-CM

## 2023-06-19 DIAGNOSIS — G62.9 NEUROPATHY: ICD-10-CM

## 2023-06-19 PROCEDURE — 4004F PT TOBACCO SCREEN RCVD TLK: CPT | Performed by: INTERNAL MEDICINE

## 2023-06-19 PROCEDURE — 1123F ACP DISCUSS/DSCN MKR DOCD: CPT | Performed by: INTERNAL MEDICINE

## 2023-06-19 PROCEDURE — 99214 OFFICE O/P EST MOD 30 MIN: CPT | Performed by: INTERNAL MEDICINE

## 2023-06-19 PROCEDURE — 3017F COLORECTAL CA SCREEN DOC REV: CPT | Performed by: INTERNAL MEDICINE

## 2023-06-19 PROCEDURE — G8417 CALC BMI ABV UP PARAM F/U: HCPCS | Performed by: INTERNAL MEDICINE

## 2023-06-19 PROCEDURE — G8427 DOCREV CUR MEDS BY ELIG CLIN: HCPCS | Performed by: INTERNAL MEDICINE

## 2023-06-19 ASSESSMENT — ANXIETY QUESTIONNAIRES
2. NOT BEING ABLE TO STOP OR CONTROL WORRYING: 0
6. BECOMING EASILY ANNOYED OR IRRITABLE: 0
3. WORRYING TOO MUCH ABOUT DIFFERENT THINGS: 0
GAD7 TOTAL SCORE: 0
7. FEELING AFRAID AS IF SOMETHING AWFUL MIGHT HAPPEN: 0
5. BEING SO RESTLESS THAT IT IS HARD TO SIT STILL: 0
IF YOU CHECKED OFF ANY PROBLEMS ON THIS QUESTIONNAIRE, HOW DIFFICULT HAVE THESE PROBLEMS MADE IT FOR YOU TO DO YOUR WORK, TAKE CARE OF THINGS AT HOME, OR GET ALONG WITH OTHER PEOPLE: NOT DIFFICULT AT ALL
4. TROUBLE RELAXING: 0
1. FEELING NERVOUS, ANXIOUS, OR ON EDGE: 0

## 2023-06-19 ASSESSMENT — PATIENT HEALTH QUESTIONNAIRE - PHQ9
1. LITTLE INTEREST OR PLEASURE IN DOING THINGS: 0
2. FEELING DOWN, DEPRESSED OR HOPELESS: 0
SUM OF ALL RESPONSES TO PHQ QUESTIONS 1-9: 0
SUM OF ALL RESPONSES TO PHQ9 QUESTIONS 1 & 2: 0
SUM OF ALL RESPONSES TO PHQ QUESTIONS 1-9: 0

## 2023-07-05 NOTE — PROGRESS NOTES
1.  The patient has inflammation of the left shoulder accompanied by a significant arthritis most likely. The thing that is bothersome is the fact that he has a significant atrophy of the anterior portion of the upper shoulders with a significant reduction in brachioradialis reflex on that same side. This would suggest some type of neuropathic involvement. MRI of the cervical spine, as well as the left shoulder will be obtained. At that point, he probably has to see a neurosurgeon. 2. His gout is stable. He remains on his allopurinol and has not had any episodes of late. 3. He has an increased cardiovascular risk and remains on his statin. 4. He needs to minimize weight gain, which can be accomplished by eating meals, eliminating snacks, and avoiding the consumption of processed carbohydrates.

## 2023-07-05 NOTE — PROGRESS NOTES
comes in for return visit complaining of pain in his left shoulder accompanied by vague numbness of his distal left arm. In addition to that he has atrophy of his left shoulder muscles. He has a past history of gout, as well as dyslipidemia, above and beyond obesity.

## 2023-07-05 NOTE — PROGRESS NOTES
[facility] and Primary Care  [address] [address]  Phone:  [phone]  Fax: [phone]       Chief Complaint   Patient presents with    6 Month Follow-Up   . SUBJECTIVE:    Jacob Anne is a 70 y.o. male  Dictation on: 07/04/2023 11:23 PM by: Robbin Genao [[id number]]          Current Outpatient Medications   Medication Sig Dispense Refill    allopurinol (ZYLOPRIM) 300 MG tablet Take 1 tablet by mouth daily      fluticasone (FLONASE) 50 MCG/ACT nasal spray 2 SPRAYS IN EACH NOSTRIL DAILY. NEED APPT      indomethacin (INDOCIN) 50 MG capsule TAKE 1 CAPSULE BY MOUTH THREE TIMES A DAY AS NEEDED      rosuvastatin (CRESTOR) 10 MG tablet Take 1 tablet by mouth daily       No current facility-administered medications for this visit.      Past Medical History:   Diagnosis Date    Arthritis     Hypercholesterolemia      Past Surgical History:   Procedure Laterality Date    COLONOSCOPY       No Known Allergies      REVIEW OF SYSTEMS:  General: negative for - chills or fever  ENT: negative for - headaches, nasal congestion or tinnitus  Respiratory: negative for - cough, hemoptysis, shortness of breath or wheezing  Cardiovascular : negative for - chest pain, edema, palpitations or shortness of breath  Gastrointestinal: negative for - abdominal pain, blood in stools, heartburn or nausea/vomiting  Genito-Urinary: no dysuria, trouble voiding, or hematuria  Musculoskeletal: negative for - gait disturbance, joint pain, joint stiffness or joint swelling  Neurological: no TIA or stroke symptoms  Hematologic: no bruises, no bleeding, no swollen glands  Integument: no lumps, mole changes, nail changes or rash  Endocrine: no malaise/lethargy or unexpected weight changes      Social History     Socioeconomic History    Marital status:      Spouse name: None    Number of children: None    Years of education: None    Highest education level: None   Tobacco Use    Smoking status: Some Days

## 2023-07-21 ENCOUNTER — HOSPITAL ENCOUNTER (OUTPATIENT)
Facility: HOSPITAL | Age: 63
End: 2023-07-21
Attending: INTERNAL MEDICINE
Payer: MEDICARE

## 2023-07-21 DIAGNOSIS — M62.512 ATROPHY OF MUSCLE OF LEFT SHOULDER: ICD-10-CM

## 2023-07-21 PROCEDURE — 72141 MRI NECK SPINE W/O DYE: CPT

## 2023-07-21 PROCEDURE — 73221 MRI JOINT UPR EXTREM W/O DYE: CPT

## 2023-08-11 ENCOUNTER — OFFICE VISIT (OUTPATIENT)
Facility: CLINIC | Age: 63
End: 2023-08-11

## 2023-08-11 VITALS
TEMPERATURE: 98.5 F | WEIGHT: 220.2 LBS | DIASTOLIC BLOOD PRESSURE: 72 MMHG | BODY MASS INDEX: 31.52 KG/M2 | HEIGHT: 70 IN | SYSTOLIC BLOOD PRESSURE: 125 MMHG | RESPIRATION RATE: 18 BRPM | HEART RATE: 77 BPM | OXYGEN SATURATION: 99 %

## 2023-08-11 DIAGNOSIS — E78.5 DYSLIPIDEMIA: ICD-10-CM

## 2023-08-11 DIAGNOSIS — M19.012 PRIMARY OSTEOARTHRITIS OF LEFT SHOULDER: ICD-10-CM

## 2023-08-11 DIAGNOSIS — C79.82 METASTATIC ADENOCARCINOMA TO PROSTATE (HCC): ICD-10-CM

## 2023-08-11 DIAGNOSIS — Z00.00 MEDICARE ANNUAL WELLNESS VISIT, SUBSEQUENT: Primary | ICD-10-CM

## 2023-08-11 DIAGNOSIS — M54.50 ACUTE MIDLINE LOW BACK PAIN WITHOUT SCIATICA: ICD-10-CM

## 2023-08-11 DIAGNOSIS — M10.9 GOUT, UNSPECIFIED CAUSE, UNSPECIFIED CHRONICITY, UNSPECIFIED SITE: ICD-10-CM

## 2023-08-11 ASSESSMENT — PATIENT HEALTH QUESTIONNAIRE - PHQ9
SUM OF ALL RESPONSES TO PHQ9 QUESTIONS 1 & 2: 0
1. LITTLE INTEREST OR PLEASURE IN DOING THINGS: 0
SUM OF ALL RESPONSES TO PHQ QUESTIONS 1-9: 0
2. FEELING DOWN, DEPRESSED OR HOPELESS: 0
SUM OF ALL RESPONSES TO PHQ QUESTIONS 1-9: 0

## 2023-08-11 ASSESSMENT — ANXIETY QUESTIONNAIRES
IF YOU CHECKED OFF ANY PROBLEMS ON THIS QUESTIONNAIRE, HOW DIFFICULT HAVE THESE PROBLEMS MADE IT FOR YOU TO DO YOUR WORK, TAKE CARE OF THINGS AT HOME, OR GET ALONG WITH OTHER PEOPLE: NOT DIFFICULT AT ALL
2. NOT BEING ABLE TO STOP OR CONTROL WORRYING: 0
3. WORRYING TOO MUCH ABOUT DIFFERENT THINGS: 0
5. BEING SO RESTLESS THAT IT IS HARD TO SIT STILL: 0
1. FEELING NERVOUS, ANXIOUS, OR ON EDGE: 0
GAD7 TOTAL SCORE: 0
4. TROUBLE RELAXING: 0
7. FEELING AFRAID AS IF SOMETHING AWFUL MIGHT HAPPEN: 0
6. BECOMING EASILY ANNOYED OR IRRITABLE: 0

## 2023-08-11 ASSESSMENT — LIFESTYLE VARIABLES
HOW MANY STANDARD DRINKS CONTAINING ALCOHOL DO YOU HAVE ON A TYPICAL DAY: PATIENT DOES NOT DRINK
HOW OFTEN DO YOU HAVE A DRINK CONTAINING ALCOHOL: NEVER

## 2023-08-11 NOTE — PROGRESS NOTES
Marleny Cornejo is a 70 y.o. male    Chief Complaint   Patient presents with    Medicare AWV     1. Have you been to the ER, urgent care clinic since your last visit? Hospitalized since your last visit? No    2. Have you seen or consulted any other health care providers outside of the [address] [place] [address] Avenue since your last visit? Include any pap smears or colon screening.  No

## 2023-08-12 PROBLEM — M19.012 PRIMARY OSTEOARTHRITIS OF LEFT SHOULDER: Status: ACTIVE | Noted: 2023-08-12

## 2023-08-12 PROBLEM — C79.82 METASTATIC ADENOCARCINOMA TO PROSTATE (HCC): Status: ACTIVE | Noted: 2022-12-17

## 2023-08-12 RX ORDER — INDOMETHACIN 50 MG/1
CAPSULE ORAL
Qty: 60 CAPSULE | Refills: 0 | Status: SHIPPED | OUTPATIENT
Start: 2023-08-12

## 2023-08-12 RX ORDER — ALLOPURINOL 300 MG/1
300 TABLET ORAL DAILY
Qty: 30 TABLET | Refills: 11 | Status: SHIPPED | OUTPATIENT
Start: 2023-08-12

## 2023-08-12 NOTE — PROGRESS NOTES
[facility] and Primary Care  [address] [address]  Phone:  [phone]  Fax: [phone]       Chief Complaint   Patient presents with    Medicare AWV   . SUBJECTIVE:    Tyler Long is a 70 y.o. male  Dictation on: 08/12/2023 11:40 AM by: Simón Sánchez [[id number]]          Current Outpatient Medications   Medication Sig Dispense Refill    allopurinol (ZYLOPRIM) 300 MG tablet Take 1 tablet by mouth daily      fluticasone (FLONASE) 50 MCG/ACT nasal spray 2 SPRAYS IN EACH NOSTRIL DAILY. NEED APPT      indomethacin (INDOCIN) 50 MG capsule TAKE 1 CAPSULE BY MOUTH THREE TIMES A DAY AS NEEDED      rosuvastatin (CRESTOR) 10 MG tablet Take 1 tablet by mouth daily       No current facility-administered medications for this visit.      Past Medical History:   Diagnosis Date    Arthritis     Hypercholesterolemia      Past Surgical History:   Procedure Laterality Date    COLONOSCOPY       No Known Allergies      REVIEW OF SYSTEMS:  General: negative for - chills or fever  ENT: negative for - headaches, nasal congestion or tinnitus  Respiratory: negative for - cough, hemoptysis, shortness of breath or wheezing  Cardiovascular : negative for - chest pain, edema, palpitations or shortness of breath  Gastrointestinal: negative for - abdominal pain, blood in stools, heartburn or nausea/vomiting  Genito-Urinary: no dysuria, trouble voiding, or hematuria  Musculoskeletal: negative for - gait disturbance, joint pain, joint stiffness or joint swelling  Neurological: no TIA or stroke symptoms  Hematologic: no bruises, no bleeding, no swollen glands  Integument: no lumps, mole changes, nail changes or rash  Endocrine: no malaise/lethargy or unexpected weight changes      Social History     Socioeconomic History    Marital status:      Spouse name: None    Number of children: None    Years of education: None    Highest education level: None   Tobacco Use    Smoking status: Some Days

## 2023-09-04 DIAGNOSIS — M10.9 GOUT, UNSPECIFIED CAUSE, UNSPECIFIED CHRONICITY, UNSPECIFIED SITE: ICD-10-CM

## 2023-09-06 RX ORDER — INDOMETHACIN 50 MG/1
CAPSULE ORAL
Qty: 60 CAPSULE | Refills: 0 | Status: SHIPPED | OUTPATIENT
Start: 2023-09-06

## 2023-10-06 DIAGNOSIS — M10.9 GOUT, UNSPECIFIED CAUSE, UNSPECIFIED CHRONICITY, UNSPECIFIED SITE: ICD-10-CM

## 2023-10-08 RX ORDER — INDOMETHACIN 50 MG/1
CAPSULE ORAL
Qty: 60 CAPSULE | Refills: 0 | Status: SHIPPED | OUTPATIENT
Start: 2023-10-08

## 2023-10-28 DIAGNOSIS — M10.9 GOUT, UNSPECIFIED CAUSE, UNSPECIFIED CHRONICITY, UNSPECIFIED SITE: ICD-10-CM

## 2023-10-31 RX ORDER — INDOMETHACIN 50 MG/1
CAPSULE ORAL
Qty: 60 CAPSULE | Refills: 0 | Status: SHIPPED | OUTPATIENT
Start: 2023-10-31

## 2023-11-14 ENCOUNTER — OFFICE VISIT (OUTPATIENT)
Facility: CLINIC | Age: 63
End: 2023-11-14
Payer: MEDICARE

## 2023-11-14 VITALS
HEART RATE: 88 BPM | OXYGEN SATURATION: 98 % | SYSTOLIC BLOOD PRESSURE: 144 MMHG | WEIGHT: 226.9 LBS | BODY MASS INDEX: 32.48 KG/M2 | RESPIRATION RATE: 18 BRPM | TEMPERATURE: 98 F | HEIGHT: 70 IN | DIASTOLIC BLOOD PRESSURE: 68 MMHG

## 2023-11-14 DIAGNOSIS — M62.522 ATROPHY OF MUSCLE OF LEFT UPPER ARM: ICD-10-CM

## 2023-11-14 DIAGNOSIS — E04.9 NODULAR GOITER: ICD-10-CM

## 2023-11-14 DIAGNOSIS — G47.00 INSOMNIA, UNSPECIFIED TYPE: ICD-10-CM

## 2023-11-14 DIAGNOSIS — J43.1 PANLOBULAR EMPHYSEMA (HCC): Primary | ICD-10-CM

## 2023-11-14 DIAGNOSIS — E66.9 OBESITY (BMI 30.0-34.9): ICD-10-CM

## 2023-11-14 DIAGNOSIS — C79.82 METASTATIC ADENOCARCINOMA TO PROSTATE (HCC): ICD-10-CM

## 2023-11-14 PROCEDURE — 1123F ACP DISCUSS/DSCN MKR DOCD: CPT | Performed by: INTERNAL MEDICINE

## 2023-11-14 PROCEDURE — 4004F PT TOBACCO SCREEN RCVD TLK: CPT | Performed by: INTERNAL MEDICINE

## 2023-11-14 PROCEDURE — G8427 DOCREV CUR MEDS BY ELIG CLIN: HCPCS | Performed by: INTERNAL MEDICINE

## 2023-11-14 PROCEDURE — 3023F SPIROM DOC REV: CPT | Performed by: INTERNAL MEDICINE

## 2023-11-14 PROCEDURE — 3017F COLORECTAL CA SCREEN DOC REV: CPT | Performed by: INTERNAL MEDICINE

## 2023-11-14 PROCEDURE — G8484 FLU IMMUNIZE NO ADMIN: HCPCS | Performed by: INTERNAL MEDICINE

## 2023-11-14 PROCEDURE — 99214 OFFICE O/P EST MOD 30 MIN: CPT | Performed by: INTERNAL MEDICINE

## 2023-11-14 PROCEDURE — G8417 CALC BMI ABV UP PARAM F/U: HCPCS | Performed by: INTERNAL MEDICINE

## 2023-11-14 ASSESSMENT — PATIENT HEALTH QUESTIONNAIRE - PHQ9
1. LITTLE INTEREST OR PLEASURE IN DOING THINGS: 0
SUM OF ALL RESPONSES TO PHQ QUESTIONS 1-9: 0
SUM OF ALL RESPONSES TO PHQ9 QUESTIONS 1 & 2: 0
SUM OF ALL RESPONSES TO PHQ QUESTIONS 1-9: 0
2. FEELING DOWN, DEPRESSED OR HOPELESS: 0

## 2023-11-26 PROBLEM — G47.00 INSOMNIA: Status: ACTIVE | Noted: 2023-11-26

## 2023-11-26 PROBLEM — M62.522 ATROPHY OF MUSCLE OF LEFT UPPER ARM: Status: ACTIVE | Noted: 2023-11-26

## 2024-03-14 ENCOUNTER — OFFICE VISIT (OUTPATIENT)
Facility: CLINIC | Age: 64
End: 2024-03-14

## 2024-03-14 VITALS
OXYGEN SATURATION: 99 % | DIASTOLIC BLOOD PRESSURE: 68 MMHG | HEIGHT: 70 IN | TEMPERATURE: 98 F | WEIGHT: 236.5 LBS | SYSTOLIC BLOOD PRESSURE: 135 MMHG | BODY MASS INDEX: 33.86 KG/M2 | HEART RATE: 78 BPM | RESPIRATION RATE: 16 BRPM

## 2024-03-14 DIAGNOSIS — C79.82 METASTATIC ADENOCARCINOMA TO PROSTATE (HCC): ICD-10-CM

## 2024-03-14 DIAGNOSIS — E66.01 SEVERE OBESITY (BMI 35.0-39.9) WITH COMORBIDITY (HCC): ICD-10-CM

## 2024-03-14 DIAGNOSIS — E78.5 DYSLIPIDEMIA: ICD-10-CM

## 2024-03-14 DIAGNOSIS — J43.1 PANLOBULAR EMPHYSEMA (HCC): Primary | ICD-10-CM

## 2024-03-14 DIAGNOSIS — Z87.891 PERSONAL HISTORY OF TOBACCO USE: ICD-10-CM

## 2024-03-14 DIAGNOSIS — Z00.00 MEDICARE ANNUAL WELLNESS VISIT, SUBSEQUENT: ICD-10-CM

## 2024-03-14 DIAGNOSIS — E04.9 NODULAR GOITER: ICD-10-CM

## 2024-03-14 DIAGNOSIS — M10.9 GOUT, UNSPECIFIED CAUSE, UNSPECIFIED CHRONICITY, UNSPECIFIED SITE: ICD-10-CM

## 2024-03-14 RX ORDER — ABIRATERONE ACETATE 250 MG/1
250 TABLET ORAL DAILY
COMMUNITY
Start: 2024-03-05

## 2024-03-14 RX ORDER — PREDNISONE 5 MG/1
5 TABLET ORAL DAILY
COMMUNITY
Start: 2024-02-23

## 2024-03-14 SDOH — ECONOMIC STABILITY: FOOD INSECURITY: WITHIN THE PAST 12 MONTHS, YOU WORRIED THAT YOUR FOOD WOULD RUN OUT BEFORE YOU GOT MONEY TO BUY MORE.: NEVER TRUE

## 2024-03-14 SDOH — ECONOMIC STABILITY: INCOME INSECURITY: HOW HARD IS IT FOR YOU TO PAY FOR THE VERY BASICS LIKE FOOD, HOUSING, MEDICAL CARE, AND HEATING?: NOT HARD AT ALL

## 2024-03-14 SDOH — ECONOMIC STABILITY: FOOD INSECURITY: WITHIN THE PAST 12 MONTHS, THE FOOD YOU BOUGHT JUST DIDN'T LAST AND YOU DIDN'T HAVE MONEY TO GET MORE.: NEVER TRUE

## 2024-03-14 ASSESSMENT — PATIENT HEALTH QUESTIONNAIRE - PHQ9
SUM OF ALL RESPONSES TO PHQ QUESTIONS 1-9: 0
SUM OF ALL RESPONSES TO PHQ QUESTIONS 1-9: 0
SUM OF ALL RESPONSES TO PHQ9 QUESTIONS 1 & 2: 0
SUM OF ALL RESPONSES TO PHQ QUESTIONS 1-9: 0
2. FEELING DOWN, DEPRESSED OR HOPELESS: 0
1. LITTLE INTEREST OR PLEASURE IN DOING THINGS: 0
SUM OF ALL RESPONSES TO PHQ QUESTIONS 1-9: 0

## 2024-03-14 ASSESSMENT — ANXIETY QUESTIONNAIRES
1. FEELING NERVOUS, ANXIOUS, OR ON EDGE: 0
7. FEELING AFRAID AS IF SOMETHING AWFUL MIGHT HAPPEN: 0
6. BECOMING EASILY ANNOYED OR IRRITABLE: 0
3. WORRYING TOO MUCH ABOUT DIFFERENT THINGS: 0
IF YOU CHECKED OFF ANY PROBLEMS ON THIS QUESTIONNAIRE, HOW DIFFICULT HAVE THESE PROBLEMS MADE IT FOR YOU TO DO YOUR WORK, TAKE CARE OF THINGS AT HOME, OR GET ALONG WITH OTHER PEOPLE: NOT DIFFICULT AT ALL
5. BEING SO RESTLESS THAT IT IS HARD TO SIT STILL: 0
4. TROUBLE RELAXING: 0
2. NOT BEING ABLE TO STOP OR CONTROL WORRYING: 0
GAD7 TOTAL SCORE: 0

## 2024-03-15 LAB
ALBUMIN SERPL-MCNC: 3.5 G/DL (ref 3.5–5)
ALBUMIN/GLOB SERPL: 1 (ref 1.1–2.2)
ALP SERPL-CCNC: 62 U/L (ref 45–117)
ALT SERPL-CCNC: 41 U/L (ref 12–78)
ANION GAP SERPL CALC-SCNC: 5 MMOL/L (ref 5–15)
APPEARANCE UR: CLEAR
AST SERPL-CCNC: 20 U/L (ref 15–37)
BACTERIA URNS QL MICRO: ABNORMAL /HPF
BASOPHILS # BLD: 0 K/UL (ref 0–0.1)
BASOPHILS NFR BLD: 0 % (ref 0–1)
BILIRUB SERPL-MCNC: 0.4 MG/DL (ref 0.2–1)
BILIRUB UR QL: NEGATIVE
BUN SERPL-MCNC: 14 MG/DL (ref 6–20)
BUN/CREAT SERPL: 13 (ref 12–20)
CALCIUM SERPL-MCNC: 9.7 MG/DL (ref 8.5–10.1)
CHLORIDE SERPL-SCNC: 108 MMOL/L (ref 97–108)
CHOLEST SERPL-MCNC: 120 MG/DL
CO2 SERPL-SCNC: 28 MMOL/L (ref 21–32)
COLOR UR: ABNORMAL
CREAT SERPL-MCNC: 1.04 MG/DL (ref 0.7–1.3)
CRP SERPL HS-MCNC: 3 MG/L
DIFFERENTIAL METHOD BLD: ABNORMAL
EOSINOPHIL # BLD: 0.1 K/UL (ref 0–0.4)
EOSINOPHIL NFR BLD: 0 % (ref 0–7)
EPITH CASTS URNS QL MICRO: ABNORMAL /LPF
ERYTHROCYTE [DISTWIDTH] IN BLOOD BY AUTOMATED COUNT: 14 % (ref 11.5–14.5)
GLOBULIN SER CALC-MCNC: 3.5 G/DL (ref 2–4)
GLUCOSE SERPL-MCNC: 165 MG/DL (ref 65–100)
GLUCOSE UR STRIP.AUTO-MCNC: NEGATIVE MG/DL
HCT VFR BLD AUTO: 38.7 % (ref 36.6–50.3)
HDLC SERPL-MCNC: 42 MG/DL
HDLC SERPL: 2.9 (ref 0–5)
HGB BLD-MCNC: 12.5 G/DL (ref 12.1–17)
HGB UR QL STRIP: NEGATIVE
HYALINE CASTS URNS QL MICRO: ABNORMAL /LPF (ref 0–5)
IMM GRANULOCYTES # BLD AUTO: 0.1 K/UL (ref 0–0.04)
IMM GRANULOCYTES NFR BLD AUTO: 1 % (ref 0–0.5)
KETONES UR QL STRIP.AUTO: NEGATIVE MG/DL
LDLC SERPL CALC-MCNC: 55.8 MG/DL (ref 0–100)
LEUKOCYTE ESTERASE UR QL STRIP.AUTO: NEGATIVE
LYMPHOCYTES # BLD: 1.5 K/UL (ref 0.8–3.5)
LYMPHOCYTES NFR BLD: 13 % (ref 12–49)
MCH RBC QN AUTO: 29.4 PG (ref 26–34)
MCHC RBC AUTO-ENTMCNC: 32.3 G/DL (ref 30–36.5)
MCV RBC AUTO: 91.1 FL (ref 80–99)
MONOCYTES # BLD: 0.8 K/UL (ref 0–1)
MONOCYTES NFR BLD: 7 % (ref 5–13)
NEUTS SEG # BLD: 9.3 K/UL (ref 1.8–8)
NEUTS SEG NFR BLD: 79 % (ref 32–75)
NITRITE UR QL STRIP.AUTO: NEGATIVE
NRBC # BLD: 0 K/UL (ref 0–0.01)
NRBC BLD-RTO: 0 PER 100 WBC
PH UR STRIP: 6 (ref 5–8)
PLATELET # BLD AUTO: 256 K/UL (ref 150–400)
PMV BLD AUTO: 11.5 FL (ref 8.9–12.9)
POTASSIUM SERPL-SCNC: 4 MMOL/L (ref 3.5–5.1)
PROT SERPL-MCNC: 7 G/DL (ref 6.4–8.2)
PROT UR STRIP-MCNC: NEGATIVE MG/DL
RBC # BLD AUTO: 4.25 M/UL (ref 4.1–5.7)
RBC #/AREA URNS HPF: ABNORMAL /HPF (ref 0–5)
SODIUM SERPL-SCNC: 141 MMOL/L (ref 136–145)
SP GR UR REFRACTOMETRY: 1.01 (ref 1–1.03)
TRIGL SERPL-MCNC: 111 MG/DL
TSH SERPL DL<=0.05 MIU/L-ACNC: 0.53 UIU/ML (ref 0.36–3.74)
UROBILINOGEN UR QL STRIP.AUTO: 0.2 EU/DL (ref 0.2–1)
VLDLC SERPL CALC-MCNC: 22.2 MG/DL
WBC # BLD AUTO: 11.8 K/UL (ref 4.1–11.1)
WBC URNS QL MICRO: ABNORMAL /HPF (ref 0–4)
YEAST URNS QL MICRO: PRESENT

## 2024-03-15 NOTE — PROGRESS NOTES
1. The patient has existing COPD, but this is quite stable.  2. He has a nodular goiter, which has not really changed in size.  3. He remains on a statin for his increased cardiovascular risk.  4. He needs to minimize further weight gain, which can be accomplished by eating meals, eliminating snacks and avoiding the consumption of processed carbohydrates.  5. He will follow with urology for his metastatic prostate cancer.    
Chief Complaint   Patient presents with    Medicare AWV     \"Have you been to the ER, urgent care clinic since your last visit?  Hospitalized since your last visit?\"    NO    “Have you seen or consulted any other health care providers outside of [facility] since your last visit?”    NO          Click Here for Release of Records Request  
Comes in for return visit, stating that he has done well.  He has severe prostate cancer with a PSA well over 2,000.  He is now down to barely detectable with chemotherapy and he is doing quite well.    He is no longer working, as would be expected given the rigors of an [employer] profession.    He has a past history of gout, as well as dyslipidemia, obesity and COPD.    
Directives:  Do you have a Living Will?: (!) No    Intervention:          Tobacco Use:  Tobacco Use: High Risk (3/14/2024)    Patient History     Smoking Tobacco Use: Some Days     Smokeless Tobacco Use: Never     Passive Exposure: Not on file     E-cigarette/Vaping       Questions Responses    E-cigarette/Vaping Use     Start Date     Passive Exposure     Quit Date     Counseling Given     Comments           Interventions:  Patient encouraged to discontinue cigarette smoking                      Objective   Vitals:    03/14/24 1105   BP: 135/68   Site: Left Upper Arm   Position: Sitting   Cuff Size: Large Adult   Pulse: 78   Resp: 16   Temp: 98 °F (36.7 °C)   TempSrc: Oral   SpO2: 99%   Weight: 107.3 kg (236 lb 8 oz)   Height: 1.778 m (5' 10\")      Body mass index is 33.93 kg/m².               No Known Allergies  Prior to Visit Medications    Medication Sig Taking? Authorizing Provider   allopurinol (ZYLOPRIM) 300 MG tablet Take 1 tablet by mouth daily Yes Jose Manuel Griffin MD   predniSONE (DELTASONE) 5 MG tablet Take 1 tablet by mouth daily Yes ProviderMaicol MD   abiraterone acetate (ZYTIGA) 250 MG tablet Take 1 tablet by mouth daily Yes ProviderMaicol MD   diclofenac sodium (VOLTAREN) 1 % GEL Apply 4 g topically 4 times daily as needed for Pain Yes Jose Manuel Griffin MD   fluticasone (FLONASE) 50 MCG/ACT nasal spray 2 SPRAYS IN EACH NOSTRIL DAILY.NEED APPT Yes Automatic Reconciliation, Ar   rosuvastatin (CRESTOR) 10 MG tablet Take 1 tablet by mouth daily Yes Automatic Reconciliation, Ar       CareTeam (Including outside providers/suppliers regularly involved in providing care):   Patient Care Team:  Jose Manuel Griffin MD as PCP - General  Jose Manuel Griffin MD as PCP - Empaneled Provider     Reviewed and updated this visit:  Tobacco  Allergies  Meds  Problems  Med Hx  Surg Hx  Soc Hx  Fam Hx

## 2024-03-16 LAB — APO B SERPL-MCNC: 72 MG/DL

## 2024-03-17 RX ORDER — ALLOPURINOL 300 MG/1
300 TABLET ORAL DAILY
Qty: 30 TABLET | Refills: 11 | Status: SHIPPED | OUTPATIENT
Start: 2024-03-17

## 2024-04-15 ENCOUNTER — HOSPITAL ENCOUNTER (OUTPATIENT)
Facility: HOSPITAL | Age: 64
Discharge: HOME | End: 2024-04-18
Attending: INTERNAL MEDICINE
Payer: MEDICARE

## 2024-04-15 DIAGNOSIS — Z87.891 PERSONAL HISTORY OF TOBACCO USE: ICD-10-CM

## 2024-04-15 PROCEDURE — 71271 CT THORAX LUNG CANCER SCR C-: CPT

## 2024-09-16 ENCOUNTER — OFFICE VISIT (OUTPATIENT)
Facility: CLINIC | Age: 64
End: 2024-09-16
Payer: MEDICARE

## 2024-09-16 VITALS
RESPIRATION RATE: 16 BRPM | BODY MASS INDEX: 32.18 KG/M2 | HEART RATE: 79 BPM | OXYGEN SATURATION: 99 % | DIASTOLIC BLOOD PRESSURE: 67 MMHG | SYSTOLIC BLOOD PRESSURE: 128 MMHG | WEIGHT: 224.8 LBS | TEMPERATURE: 98.7 F | HEIGHT: 70 IN

## 2024-09-16 DIAGNOSIS — E78.5 DYSLIPIDEMIA: ICD-10-CM

## 2024-09-16 DIAGNOSIS — J06.9 UPPER RESPIRATORY TRACT INFECTION, UNSPECIFIED TYPE: ICD-10-CM

## 2024-09-16 DIAGNOSIS — J01.00 ACUTE NON-RECURRENT MAXILLARY SINUSITIS: Primary | ICD-10-CM

## 2024-09-16 DIAGNOSIS — J43.1 PANLOBULAR EMPHYSEMA (HCC): ICD-10-CM

## 2024-09-16 DIAGNOSIS — J45.20 MILD INTERMITTENT REACTIVE AIRWAY DISEASE WITHOUT COMPLICATION: ICD-10-CM

## 2024-09-16 PROBLEM — J45.909 REACTIVE AIRWAY DISEASE: Status: ACTIVE | Noted: 2024-09-16

## 2024-09-16 PROCEDURE — 3017F COLORECTAL CA SCREEN DOC REV: CPT | Performed by: INTERNAL MEDICINE

## 2024-09-16 PROCEDURE — 1123F ACP DISCUSS/DSCN MKR DOCD: CPT | Performed by: INTERNAL MEDICINE

## 2024-09-16 PROCEDURE — G8427 DOCREV CUR MEDS BY ELIG CLIN: HCPCS | Performed by: INTERNAL MEDICINE

## 2024-09-16 PROCEDURE — 1036F TOBACCO NON-USER: CPT | Performed by: INTERNAL MEDICINE

## 2024-09-16 PROCEDURE — G8417 CALC BMI ABV UP PARAM F/U: HCPCS | Performed by: INTERNAL MEDICINE

## 2024-09-16 PROCEDURE — 99214 OFFICE O/P EST MOD 30 MIN: CPT | Performed by: INTERNAL MEDICINE

## 2024-09-16 PROCEDURE — 3023F SPIROM DOC REV: CPT | Performed by: INTERNAL MEDICINE

## 2024-09-16 RX ORDER — FLUTICASONE PROPIONATE 50 MCG
2 SPRAY, SUSPENSION (ML) NASAL DAILY
Qty: 16 G | Refills: 11 | Status: SHIPPED | OUTPATIENT
Start: 2024-09-16

## 2024-09-16 RX ORDER — ALBUTEROL SULFATE 90 UG/1
2 INHALANT RESPIRATORY (INHALATION) EVERY 4 HOURS PRN
Qty: 18 G | Refills: 3 | Status: SHIPPED | OUTPATIENT
Start: 2024-09-16

## 2024-09-16 RX ORDER — ROSUVASTATIN CALCIUM 10 MG/1
10 TABLET, COATED ORAL DAILY
Qty: 30 TABLET | Refills: 11 | Status: SHIPPED | OUTPATIENT
Start: 2024-09-16

## 2024-09-16 RX ORDER — AMOXICILLIN 500 MG/1
500 CAPSULE ORAL 3 TIMES DAILY
Qty: 42 CAPSULE | Refills: 0 | Status: SHIPPED | OUTPATIENT
Start: 2024-09-16 | End: 2024-09-30

## 2024-09-16 SDOH — ECONOMIC STABILITY: FOOD INSECURITY: WITHIN THE PAST 12 MONTHS, THE FOOD YOU BOUGHT JUST DIDN'T LAST AND YOU DIDN'T HAVE MONEY TO GET MORE.: NEVER TRUE

## 2024-09-16 SDOH — ECONOMIC STABILITY: FOOD INSECURITY: WITHIN THE PAST 12 MONTHS, YOU WORRIED THAT YOUR FOOD WOULD RUN OUT BEFORE YOU GOT MONEY TO BUY MORE.: NEVER TRUE

## 2024-09-16 SDOH — ECONOMIC STABILITY: INCOME INSECURITY: HOW HARD IS IT FOR YOU TO PAY FOR THE VERY BASICS LIKE FOOD, HOUSING, MEDICAL CARE, AND HEATING?: NOT HARD AT ALL

## 2024-09-16 ASSESSMENT — ANXIETY QUESTIONNAIRES
6. BECOMING EASILY ANNOYED OR IRRITABLE: NOT AT ALL
3. WORRYING TOO MUCH ABOUT DIFFERENT THINGS: NOT AT ALL
5. BEING SO RESTLESS THAT IT IS HARD TO SIT STILL: NOT AT ALL
7. FEELING AFRAID AS IF SOMETHING AWFUL MIGHT HAPPEN: NOT AT ALL
IF YOU CHECKED OFF ANY PROBLEMS ON THIS QUESTIONNAIRE, HOW DIFFICULT HAVE THESE PROBLEMS MADE IT FOR YOU TO DO YOUR WORK, TAKE CARE OF THINGS AT HOME, OR GET ALONG WITH OTHER PEOPLE: NOT DIFFICULT AT ALL
1. FEELING NERVOUS, ANXIOUS, OR ON EDGE: NOT AT ALL
GAD7 TOTAL SCORE: 0
2. NOT BEING ABLE TO STOP OR CONTROL WORRYING: NOT AT ALL
4. TROUBLE RELAXING: NOT AT ALL

## 2024-09-16 ASSESSMENT — PATIENT HEALTH QUESTIONNAIRE - PHQ9
SUM OF ALL RESPONSES TO PHQ9 QUESTIONS 1 & 2: 0
SUM OF ALL RESPONSES TO PHQ QUESTIONS 1-9: 0
1. LITTLE INTEREST OR PLEASURE IN DOING THINGS: NOT AT ALL
SUM OF ALL RESPONSES TO PHQ QUESTIONS 1-9: 0
2. FEELING DOWN, DEPRESSED OR HOPELESS: NOT AT ALL

## 2025-01-17 SDOH — ECONOMIC STABILITY: TRANSPORTATION INSECURITY
IN THE PAST 12 MONTHS, HAS THE LACK OF TRANSPORTATION KEPT YOU FROM MEDICAL APPOINTMENTS OR FROM GETTING MEDICATIONS?: NO

## 2025-01-17 SDOH — ECONOMIC STABILITY: FOOD INSECURITY: WITHIN THE PAST 12 MONTHS, THE FOOD YOU BOUGHT JUST DIDN'T LAST AND YOU DIDN'T HAVE MONEY TO GET MORE.: NEVER TRUE

## 2025-01-17 SDOH — ECONOMIC STABILITY: FOOD INSECURITY: WITHIN THE PAST 12 MONTHS, YOU WORRIED THAT YOUR FOOD WOULD RUN OUT BEFORE YOU GOT MONEY TO BUY MORE.: NEVER TRUE

## 2025-01-17 SDOH — ECONOMIC STABILITY: INCOME INSECURITY: IN THE LAST 12 MONTHS, WAS THERE A TIME WHEN YOU WERE NOT ABLE TO PAY THE MORTGAGE OR RENT ON TIME?: NO

## 2025-01-20 ENCOUNTER — OFFICE VISIT (OUTPATIENT)
Facility: CLINIC | Age: 65
End: 2025-01-20

## 2025-01-20 VITALS
HEART RATE: 73 BPM | TEMPERATURE: 98.1 F | SYSTOLIC BLOOD PRESSURE: 148 MMHG | DIASTOLIC BLOOD PRESSURE: 70 MMHG | RESPIRATION RATE: 16 BRPM | WEIGHT: 222.6 LBS | OXYGEN SATURATION: 98 % | BODY MASS INDEX: 31.87 KG/M2 | HEIGHT: 70 IN

## 2025-01-20 DIAGNOSIS — C79.82 METASTATIC ADENOCARCINOMA TO PROSTATE (HCC): Primary | ICD-10-CM

## 2025-01-20 DIAGNOSIS — R73.9 HYPERGLYCEMIA: ICD-10-CM

## 2025-01-20 DIAGNOSIS — M54.50 ACUTE MIDLINE LOW BACK PAIN WITHOUT SCIATICA: ICD-10-CM

## 2025-01-20 DIAGNOSIS — E04.9 NODULAR GOITER: ICD-10-CM

## 2025-01-20 DIAGNOSIS — M10.9 GOUT, UNSPECIFIED CAUSE, UNSPECIFIED CHRONICITY, UNSPECIFIED SITE: ICD-10-CM

## 2025-01-20 DIAGNOSIS — E78.5 DYSLIPIDEMIA: ICD-10-CM

## 2025-01-20 DIAGNOSIS — J43.1 PANLOBULAR EMPHYSEMA (HCC): ICD-10-CM

## 2025-01-20 DIAGNOSIS — Z12.11 COLON CANCER SCREENING: ICD-10-CM

## 2025-01-20 DIAGNOSIS — Z00.00 MEDICARE ANNUAL WELLNESS VISIT, SUBSEQUENT: ICD-10-CM

## 2025-01-20 DIAGNOSIS — E66.811 OBESITY (BMI 30.0-34.9): ICD-10-CM

## 2025-01-20 LAB
EST. AVERAGE GLUCOSE BLD GHB EST-MCNC: 154 MG/DL
HBA1C MFR BLD: 7 % (ref 4–5.6)

## 2025-01-20 RX ORDER — ALBUTEROL SULFATE 90 UG/1
INHALANT RESPIRATORY (INHALATION)
Qty: 18 EACH | Refills: 3 | Status: SHIPPED | OUTPATIENT
Start: 2025-01-20

## 2025-01-20 SDOH — ECONOMIC STABILITY: FOOD INSECURITY: WITHIN THE PAST 12 MONTHS, THE FOOD YOU BOUGHT JUST DIDN'T LAST AND YOU DIDN'T HAVE MONEY TO GET MORE.: NEVER TRUE

## 2025-01-20 SDOH — ECONOMIC STABILITY: FOOD INSECURITY: WITHIN THE PAST 12 MONTHS, YOU WORRIED THAT YOUR FOOD WOULD RUN OUT BEFORE YOU GOT MONEY TO BUY MORE.: NEVER TRUE

## 2025-01-20 ASSESSMENT — PATIENT HEALTH QUESTIONNAIRE - PHQ9
2. FEELING DOWN, DEPRESSED OR HOPELESS: NOT AT ALL
SUM OF ALL RESPONSES TO PHQ9 QUESTIONS 1 & 2: 0
SUM OF ALL RESPONSES TO PHQ QUESTIONS 1-9: 0
1. LITTLE INTEREST OR PLEASURE IN DOING THINGS: NOT AT ALL

## 2025-01-20 ASSESSMENT — ANXIETY QUESTIONNAIRES
3. WORRYING TOO MUCH ABOUT DIFFERENT THINGS: NOT AT ALL
6. BECOMING EASILY ANNOYED OR IRRITABLE: NOT AT ALL
2. NOT BEING ABLE TO STOP OR CONTROL WORRYING: NOT AT ALL
7. FEELING AFRAID AS IF SOMETHING AWFUL MIGHT HAPPEN: NOT AT ALL
5. BEING SO RESTLESS THAT IT IS HARD TO SIT STILL: NOT AT ALL
4. TROUBLE RELAXING: NOT AT ALL
1. FEELING NERVOUS, ANXIOUS, OR ON EDGE: NOT AT ALL
IF YOU CHECKED OFF ANY PROBLEMS ON THIS QUESTIONNAIRE, HOW DIFFICULT HAVE THESE PROBLEMS MADE IT FOR YOU TO DO YOUR WORK, TAKE CARE OF THINGS AT HOME, OR GET ALONG WITH OTHER PEOPLE: NOT DIFFICULT AT ALL
GAD7 TOTAL SCORE: 0

## 2025-01-20 ASSESSMENT — LIFESTYLE VARIABLES
HOW OFTEN DO YOU HAVE A DRINK CONTAINING ALCOHOL: NEVER
HOW MANY STANDARD DRINKS CONTAINING ALCOHOL DO YOU HAVE ON A TYPICAL DAY: PATIENT DOES NOT DRINK

## 2025-01-20 NOTE — PROGRESS NOTES
Chief Complaint   Patient presents with    Medicare AWV     Patient states \" he is present for a follow-up.\"    \"Have you been to the ER, urgent care clinic since your last visit?  Hospitalized since your last visit?\"    NO    “Have you seen or consulted any other health care providers outside our system since your last visit?”    NO

## 2025-02-05 RX ORDER — ALLOPURINOL 300 MG/1
300 TABLET ORAL DAILY
Qty: 30 TABLET | Refills: 11 | Status: SHIPPED | OUTPATIENT
Start: 2025-02-05

## 2025-02-05 NOTE — PROGRESS NOTES
[facility]  [address]   [address]  [facility] [address]  Phone:  [phone]  Fax: [phone]       Chief Complaint   Patient presents with    Medicare AW   .      SUBJECTIVE:      History of Present Illness  The patient presents for a routine checkup.    He reports overall good health, with his Prostate-Specific Antigen (PSA) level currently at 0.1. He has been managing well without the need for any medication refills. He has received a Cologuard test kit from his insurance provider, which he plans to utilize and return. He maintains a high water intake. He has abstained from alcohol consumption for an extended period and ceased smoking approximately 40 years ago.    SOCIAL HISTORY  He does not drink alcohol. He used to smoke cigarettes but quit over 40 years ago.    FAMILY HISTORY  There is no family history of diabetes.         Current Outpatient Medications   Medication Sig Dispense Refill    fluticasone (FLONASE) 50 MCG/ACT nasal spray 2 sprays by Nasal route daily 16 g 11    diclofenac sodium (VOLTAREN) 1 % GEL Apply 2 g topically 4 times daily as needed for Pain 300 g 5    rosuvastatin (CRESTOR) 10 MG tablet Take 1 tablet by mouth daily 30 tablet 11    allopurinol (ZYLOPRIM) 300 MG tablet Take 1 tablet by mouth daily 30 tablet 11    predniSONE (DELTASONE) 5 MG tablet Take 1 tablet by mouth daily      abiraterone acetate (ZYTIGA) 250 MG tablet Take 1 tablet by mouth daily      albuterol sulfate HFA (PROVENTIL;VENTOLIN;PROAIR) 108 (90 Base) MCG/ACT inhaler INHALE 2 PUFFS BY MOUTH EVERY 4 HOURS AS NEEDED FOR WHEEZE 18 each 3     No current facility-administered medications for this visit.     Past Medical History:   Diagnosis Date    Arthritis     Cancer (HCC)     Hypercholesterolemia      Past Surgical History:   Procedure Laterality Date    COLONOSCOPY      PROSTATE SURGERY       No Known Allergies      REVIEW OF SYSTEMS:  General: negative for - chills or fever  ENT:

## 2025-02-05 NOTE — PATIENT INSTRUCTIONS
talk to your doctor about stop-smoking programs and medicines. These can increase your chances of quitting for good. Quitting is one of the most important things you can do to protect your heart. It is never too late to quit. Try to avoid secondhand smoke too.     Stay at a weight that's healthy for you. Talk to your doctor if you need help losing weight.     Try to get 7 to 9 hours of sleep each night.     Limit alcohol to 2 drinks a day for men and 1 drink a day for women. Too much alcohol can cause health problems.     Manage other health problems such as diabetes, high blood pressure, and high cholesterol. If you think you may have a problem with alcohol or drug use, talk to your doctor.   Medicines    Take your medicines exactly as prescribed. Call your doctor if you think you are having a problem with your medicine.     If your doctor recommends aspirin, take the amount directed each day. Make sure you take aspirin and not another kind of pain reliever, such as acetaminophen (Tylenol).   When should you call for help?   Call 911 if you have symptoms of a heart attack. These may include:    Chest pain or pressure, or a strange feeling in the chest.     Sweating.     Shortness of breath.     Pain, pressure, or a strange feeling in the back, neck, jaw, or upper belly or in one or both shoulders or arms.     Lightheadedness or sudden weakness.     A fast or irregular heartbeat.   After you call 911, the  may tell you to chew 1 adult-strength or 2 to 4 low-dose aspirin. Wait for an ambulance. Do not try to drive yourself.  Watch closely for changes in your health, and be sure to contact your doctor if you have any problems.  Where can you learn more?  Go to https://www.[employer].net/patientEd and enter F075 to learn more about \"A Healthy Heart: Care Instructions.\"  Current as of: July 31, 2024  Content Version: 14.3  © 2024 [employer].   Care instructions adapted under license by [employer].

## 2025-02-11 LAB — NONINV COLON CA DNA+OCC BLD SCRN STL QL: NEGATIVE

## 2025-03-15 ENCOUNTER — RESULTS FOLLOW-UP (OUTPATIENT)
Facility: CLINIC | Age: 65
End: 2025-03-15

## 2025-08-25 ENCOUNTER — OFFICE VISIT (OUTPATIENT)
Facility: CLINIC | Age: 65
End: 2025-08-25
Payer: MEDICARE

## 2025-08-25 VITALS
HEART RATE: 83 BPM | OXYGEN SATURATION: 100 % | WEIGHT: 197.4 LBS | HEIGHT: 70 IN | RESPIRATION RATE: 18 BRPM | TEMPERATURE: 98.4 F | BODY MASS INDEX: 28.26 KG/M2 | SYSTOLIC BLOOD PRESSURE: 124 MMHG | DIASTOLIC BLOOD PRESSURE: 66 MMHG

## 2025-08-25 DIAGNOSIS — E78.5 DYSLIPIDEMIA: ICD-10-CM

## 2025-08-25 DIAGNOSIS — M10.9 GOUT, UNSPECIFIED CAUSE, UNSPECIFIED CHRONICITY, UNSPECIFIED SITE: ICD-10-CM

## 2025-08-25 DIAGNOSIS — E11.649 UNCONTROLLED TYPE 2 DIABETES MELLITUS WITH HYPOGLYCEMIA WITHOUT COMA (HCC): Primary | ICD-10-CM

## 2025-08-25 DIAGNOSIS — J43.1 PANLOBULAR EMPHYSEMA (HCC): ICD-10-CM

## 2025-08-25 PROCEDURE — 1123F ACP DISCUSS/DSCN MKR DOCD: CPT | Performed by: INTERNAL MEDICINE

## 2025-08-25 PROCEDURE — 3046F HEMOGLOBIN A1C LEVEL >9.0%: CPT | Performed by: INTERNAL MEDICINE

## 2025-08-25 PROCEDURE — 2022F DILAT RTA XM EVC RTNOPTHY: CPT | Performed by: INTERNAL MEDICINE

## 2025-08-25 PROCEDURE — 1159F MED LIST DOCD IN RCRD: CPT | Performed by: INTERNAL MEDICINE

## 2025-08-25 PROCEDURE — 1125F AMNT PAIN NOTED PAIN PRSNT: CPT | Performed by: INTERNAL MEDICINE

## 2025-08-25 PROCEDURE — 3023F SPIROM DOC REV: CPT | Performed by: INTERNAL MEDICINE

## 2025-08-25 PROCEDURE — 3017F COLORECTAL CA SCREEN DOC REV: CPT | Performed by: INTERNAL MEDICINE

## 2025-08-25 PROCEDURE — 1036F TOBACCO NON-USER: CPT | Performed by: INTERNAL MEDICINE

## 2025-08-25 PROCEDURE — G8427 DOCREV CUR MEDS BY ELIG CLIN: HCPCS | Performed by: INTERNAL MEDICINE

## 2025-08-25 PROCEDURE — 99214 OFFICE O/P EST MOD 30 MIN: CPT | Performed by: INTERNAL MEDICINE

## 2025-08-25 PROCEDURE — G8417 CALC BMI ABV UP PARAM F/U: HCPCS | Performed by: INTERNAL MEDICINE

## 2025-08-26 LAB
EST. AVERAGE GLUCOSE BLD GHB EST-MCNC: 280 MG/DL
HBA1C MFR BLD: 11.4 % (ref 4–5.6)

## 2025-08-29 ENCOUNTER — TELEPHONE (OUTPATIENT)
Facility: CLINIC | Age: 65
End: 2025-08-29

## 2025-08-29 ENCOUNTER — RESULTS FOLLOW-UP (OUTPATIENT)
Facility: CLINIC | Age: 65
End: 2025-08-29

## 2025-08-29 RX ORDER — GLIMEPIRIDE 4 MG/1
4 TABLET ORAL
Qty: 30 TABLET | Refills: 3 | Status: SHIPPED | OUTPATIENT
Start: 2025-08-29